# Patient Record
Sex: MALE | Race: WHITE | NOT HISPANIC OR LATINO | Employment: FULL TIME | URBAN - METROPOLITAN AREA
[De-identification: names, ages, dates, MRNs, and addresses within clinical notes are randomized per-mention and may not be internally consistent; named-entity substitution may affect disease eponyms.]

---

## 2018-04-17 ENCOUNTER — HOSPITAL ENCOUNTER (OUTPATIENT)
Dept: FAMILY MEDICINE CLINIC | Facility: CLINIC | Age: 38
Setting detail: SPECIMEN
Discharge: HOME | End: 2018-04-17
Attending: FAMILY MEDICINE | Admitting: FAMILY MEDICINE

## 2018-04-17 LAB
ALBUMIN SERPL-MCNC: 4.3 G/DL (ref 3.5–4.8)
ALBUMIN/GLOB SERPL: 1.5 {RATIO} (ref 1–1.7)
ALP SERPL-CCNC: 68 IU/L (ref 32–91)
ALT SERPL-CCNC: 32 IU/L (ref 17–63)
ANION GAP SERPL CALC-SCNC: 12.5 MMOL/L (ref 10–20)
AST SERPL-CCNC: 26 IU/L (ref 15–41)
BASOPHILS # BLD AUTO: 0 10*3/UL (ref 0–0.2)
BASOPHILS NFR BLD AUTO: 0 % (ref 0–2)
BILIRUB SERPL-MCNC: 0.8 MG/DL (ref 0.3–1.2)
BUN SERPL-MCNC: 13 MG/DL (ref 8–20)
BUN/CREAT SERPL: 14.4 (ref 6.2–20.3)
CALCIUM SERPL-MCNC: 9.4 MG/DL (ref 8.9–10.3)
CHLORIDE SERPL-SCNC: 105 MMOL/L (ref 101–111)
CHOLEST SERPL-MCNC: 166 MG/DL
CHOLEST/HDLC SERPL: 5 {RATIO}
CONV CO2: 25 MMOL/L (ref 22–32)
CONV LDL CHOLESTEROL DIRECT: 113 MG/DL (ref 0–100)
CONV TOTAL PROTEIN: 7.1 G/DL (ref 6.1–7.9)
CREAT UR-MCNC: 0.9 MG/DL (ref 0.7–1.2)
DIFFERENTIAL METHOD BLD: (no result)
EOSINOPHIL # BLD AUTO: 0.1 10*3/UL (ref 0–0.3)
EOSINOPHIL # BLD AUTO: 1 % (ref 0–3)
ERYTHROCYTE [DISTWIDTH] IN BLOOD BY AUTOMATED COUNT: 13.5 % (ref 11.5–14.5)
GLOBULIN UR ELPH-MCNC: 2.8 G/DL (ref 2.5–3.8)
GLUCOSE SERPL-MCNC: 91 MG/DL (ref 65–99)
HCT VFR BLD AUTO: 50.7 % (ref 40–54)
HDLC SERPL-MCNC: 33 MG/DL
HGB BLD-MCNC: 16.9 G/DL (ref 14–18)
LDLC/HDLC SERPL: 3.4 {RATIO}
LIPID INTERPRETATION: ABNORMAL
LYMPHOCYTES # BLD AUTO: 1.8 10*3/UL (ref 0.8–4.8)
LYMPHOCYTES NFR BLD AUTO: 23 % (ref 18–42)
MCH RBC QN AUTO: 30.8 PG (ref 26–32)
MCHC RBC AUTO-ENTMCNC: 33.3 G/DL (ref 32–36)
MCV RBC AUTO: 92.5 FL (ref 80–94)
MONOCYTES # BLD AUTO: 0.7 10*3/UL (ref 0.1–1.3)
MONOCYTES NFR BLD AUTO: 10 % (ref 2–11)
NEUTROPHILS # BLD AUTO: 5 10*3/UL (ref 2.3–8.6)
NEUTROPHILS NFR BLD AUTO: 66 % (ref 50–75)
NRBC BLD AUTO-RTO: 0 /100{WBCS}
NRBC/RBC NFR BLD MANUAL: 0 10*3/UL
PLATELET # BLD AUTO: 292 10*3/UL (ref 150–450)
PMV BLD AUTO: 7.7 FL (ref 7.4–10.4)
POTASSIUM SERPL-SCNC: 3.5 MMOL/L (ref 3.6–5.1)
RBC # BLD AUTO: 5.48 10*6/UL (ref 4.6–6)
SODIUM SERPL-SCNC: 139 MMOL/L (ref 136–144)
TRIGL SERPL-MCNC: 136 MG/DL
VLDLC SERPL CALC-MCNC: 20 MG/DL
WBC # BLD AUTO: 7.7 10*3/UL (ref 4.5–11.5)

## 2019-04-10 ENCOUNTER — HOSPITAL ENCOUNTER (OUTPATIENT)
Dept: FAMILY MEDICINE CLINIC | Facility: CLINIC | Age: 39
Setting detail: SPECIMEN
Discharge: HOME | End: 2019-04-10
Attending: FAMILY MEDICINE | Admitting: FAMILY MEDICINE

## 2019-04-10 LAB
ALBUMIN SERPL-MCNC: 4.1 G/DL (ref 3.5–4.8)
ALBUMIN/GLOB SERPL: 1.5 {RATIO} (ref 1–1.7)
ALP SERPL-CCNC: 63 IU/L (ref 32–91)
ALT SERPL-CCNC: 28 IU/L (ref 17–63)
ANION GAP SERPL CALC-SCNC: 15.1 MMOL/L (ref 10–20)
AST SERPL-CCNC: 19 IU/L (ref 15–41)
BASOPHILS # BLD AUTO: 0 10*3/UL (ref 0–0.2)
BASOPHILS NFR BLD AUTO: 0 % (ref 0–2)
BILIRUB SERPL-MCNC: 0.8 MG/DL (ref 0.3–1.2)
BUN SERPL-MCNC: 15 MG/DL (ref 8–20)
BUN/CREAT SERPL: 16.7 (ref 6.2–20.3)
CALCIUM SERPL-MCNC: 9.1 MG/DL (ref 8.9–10.3)
CHLORIDE SERPL-SCNC: 100 MMOL/L (ref 101–111)
CHOLEST SERPL-MCNC: 174 MG/DL
CHOLEST/HDLC SERPL: 5.6 {RATIO}
CONV CO2: 26 MMOL/L (ref 22–32)
CONV LDL CHOLESTEROL DIRECT: 115 MG/DL (ref 0–100)
CONV TOTAL PROTEIN: 6.8 G/DL (ref 6.1–7.9)
CREAT UR-MCNC: 0.9 MG/DL (ref 0.7–1.2)
DIFFERENTIAL METHOD BLD: (no result)
EOSINOPHIL # BLD AUTO: 0.2 10*3/UL (ref 0–0.3)
EOSINOPHIL # BLD AUTO: 3 % (ref 0–3)
ERYTHROCYTE [DISTWIDTH] IN BLOOD BY AUTOMATED COUNT: 13.1 % (ref 11.5–14.5)
GLOBULIN UR ELPH-MCNC: 2.7 G/DL (ref 2.5–3.8)
GLUCOSE SERPL-MCNC: 101 MG/DL (ref 65–99)
HCT VFR BLD AUTO: 47.2 % (ref 40–54)
HDLC SERPL-MCNC: 31 MG/DL
HGB BLD-MCNC: 16.1 G/DL (ref 14–18)
LDLC/HDLC SERPL: 3.7 {RATIO}
LIPID INTERPRETATION: ABNORMAL
LYMPHOCYTES # BLD AUTO: 2.2 10*3/UL (ref 0.8–4.8)
LYMPHOCYTES NFR BLD AUTO: 26 % (ref 18–42)
MCH RBC QN AUTO: 31.8 PG (ref 26–32)
MCHC RBC AUTO-ENTMCNC: 34.1 G/DL (ref 32–36)
MCV RBC AUTO: 93.1 FL (ref 80–94)
MONOCYTES # BLD AUTO: 0.8 10*3/UL (ref 0.1–1.3)
MONOCYTES NFR BLD AUTO: 9 % (ref 2–11)
NEUTROPHILS # BLD AUTO: 5.2 10*3/UL (ref 2.3–8.6)
NEUTROPHILS NFR BLD AUTO: 62 % (ref 50–75)
NRBC BLD AUTO-RTO: 0 /100{WBCS}
NRBC/RBC NFR BLD MANUAL: 0 10*3/UL
PLATELET # BLD AUTO: 335 10*3/UL (ref 150–450)
PMV BLD AUTO: 7.5 FL (ref 7.4–10.4)
POTASSIUM SERPL-SCNC: 4.1 MMOL/L (ref 3.6–5.1)
RBC # BLD AUTO: 5.07 10*6/UL (ref 4.6–6)
SODIUM SERPL-SCNC: 137 MMOL/L (ref 136–144)
TRIGL SERPL-MCNC: 279 MG/DL
VLDLC SERPL CALC-MCNC: 27.9 MG/DL
WBC # BLD AUTO: 8.4 10*3/UL (ref 4.5–11.5)

## 2019-04-17 ENCOUNTER — HOSPITAL ENCOUNTER (OUTPATIENT)
Dept: FAMILY MEDICINE CLINIC | Facility: CLINIC | Age: 39
Setting detail: SPECIMEN
Discharge: HOME | End: 2019-04-17
Attending: FAMILY MEDICINE | Admitting: FAMILY MEDICINE

## 2019-04-17 ENCOUNTER — HOSPITAL ENCOUNTER (OUTPATIENT)
Dept: GENERAL RADIOLOGY | Facility: HOSPITAL | Age: 39
Discharge: HOME | End: 2019-04-17
Attending: FAMILY MEDICINE | Admitting: FAMILY MEDICINE

## 2019-04-17 LAB
ALBUMIN SERPL-MCNC: 4.6 G/DL (ref 3.5–4.8)
ALBUMIN/GLOB SERPL: 1.5 {RATIO} (ref 1–1.7)
ALP SERPL-CCNC: 73 IU/L (ref 32–91)
ALT SERPL-CCNC: 26 IU/L (ref 17–63)
ANION GAP SERPL CALC-SCNC: 14.6 MMOL/L (ref 10–20)
AST SERPL-CCNC: 22 IU/L (ref 15–41)
BASOPHILS # BLD AUTO: 0 10*3/UL (ref 0–0.2)
BASOPHILS NFR BLD AUTO: 0 % (ref 0–2)
BILIRUB SERPL-MCNC: 1 MG/DL (ref 0.3–1.2)
BILIRUB UR QL STRIP: ABNORMAL
BILIRUB UR QL STRIP: ABNORMAL MG/DL
BUN SERPL-MCNC: 16 MG/DL (ref 8–20)
BUN/CREAT SERPL: 16 (ref 6.2–20.3)
CALCIUM SERPL-MCNC: 9.8 MG/DL (ref 8.9–10.3)
CASTS URNS QL MICRO: ABNORMAL /[LPF]
CHLORIDE SERPL-SCNC: 101 MMOL/L (ref 101–111)
COLOR UR: ABNORMAL
CONV BACTERIA IN URINE MICRO: NEGATIVE
CONV CLARITY OF URINE: CLEAR
CONV CO2: 26 MMOL/L (ref 22–32)
CONV HYALINE CASTS IN URINE MICRO: ABNORMAL /[LPF] (ref 0–5)
CONV PROTEIN IN URINE BY AUTOMATED TEST STRIP: ABNORMAL MG/DL
CONV SMALL ROUND CELLS: ABNORMAL /[HPF]
CONV TOTAL PROTEIN: 7.6 G/DL (ref 6.1–7.9)
CONV UROBILINOGEN IN URINE BY AUTOMATED TEST STRIP: 1 MG/DL
CREAT UR-MCNC: 1 MG/DL (ref 0.7–1.2)
CULTURE INDICATED?: ABNORMAL
DIFFERENTIAL METHOD BLD: (no result)
EOSINOPHIL # BLD AUTO: 0.1 10*3/UL (ref 0–0.3)
EOSINOPHIL # BLD AUTO: 1 % (ref 0–3)
ERYTHROCYTE [DISTWIDTH] IN BLOOD BY AUTOMATED COUNT: 13.2 % (ref 11.5–14.5)
GLOBULIN UR ELPH-MCNC: 3 G/DL (ref 2.5–3.8)
GLUCOSE SERPL-MCNC: 99 MG/DL (ref 65–99)
GLUCOSE UR QL: NEGATIVE MG/DL
HCT VFR BLD AUTO: 50.6 % (ref 40–54)
HGB BLD-MCNC: 17.1 G/DL (ref 14–18)
HGB UR QL STRIP: NEGATIVE
KETONES UR QL STRIP: ABNORMAL MG/DL
LEUKOCYTE ESTERASE UR QL STRIP: NEGATIVE
LIPASE SERPL-CCNC: 28 U/L (ref 22–51)
LYMPHOCYTES # BLD AUTO: 2.1 10*3/UL (ref 0.8–4.8)
LYMPHOCYTES NFR BLD AUTO: 18 % (ref 18–42)
MCH RBC QN AUTO: 31.5 PG (ref 26–32)
MCHC RBC AUTO-ENTMCNC: 33.9 G/DL (ref 32–36)
MCV RBC AUTO: 93.1 FL (ref 80–94)
MONOCYTES # BLD AUTO: 1 10*3/UL (ref 0.1–1.3)
MONOCYTES NFR BLD AUTO: 9 % (ref 2–11)
NEUTROPHILS # BLD AUTO: 8.6 10*3/UL (ref 2.3–8.6)
NEUTROPHILS NFR BLD AUTO: 72 % (ref 50–75)
NITRITE UR QL STRIP: NEGATIVE
NRBC BLD AUTO-RTO: 0 /100{WBCS}
NRBC/RBC NFR BLD MANUAL: 0 10*3/UL
PH UR STRIP.AUTO: 6 [PH] (ref 4.5–8)
PLATELET # BLD AUTO: 318 10*3/UL (ref 150–450)
PMV BLD AUTO: 7.5 FL (ref 7.4–10.4)
POTASSIUM SERPL-SCNC: 3.6 MMOL/L (ref 3.6–5.1)
RBC # BLD AUTO: 5.43 10*6/UL (ref 4.6–6)
RBC #/AREA URNS HPF: 1 /[HPF] (ref 0–3)
SODIUM SERPL-SCNC: 138 MMOL/L (ref 136–144)
SP GR UR: 1.03 (ref 1–1.03)
SPERM URNS QL MICRO: ABNORMAL /[HPF]
SQUAMOUS SPT QL MICRO: 0 /[HPF] (ref 0–5)
UNIDENT CRYS URNS QL MICRO: ABNORMAL /[HPF]
WBC # BLD AUTO: 11.9 10*3/UL (ref 4.5–11.5)
WBC #/AREA URNS HPF: 2 /[HPF] (ref 0–5)
YEAST SPEC QL WET PREP: ABNORMAL /[HPF]

## 2019-04-19 ENCOUNTER — HOSPITAL ENCOUNTER (OUTPATIENT)
Dept: ULTRASOUND IMAGING | Facility: HOSPITAL | Age: 39
Discharge: HOME | End: 2019-04-19
Attending: FAMILY MEDICINE | Admitting: FAMILY MEDICINE

## 2019-04-25 ENCOUNTER — HOSPITAL ENCOUNTER (OUTPATIENT)
Dept: FAMILY MEDICINE CLINIC | Facility: CLINIC | Age: 39
Setting detail: SPECIMEN
Discharge: HOME | End: 2019-04-25
Attending: FAMILY MEDICINE | Admitting: FAMILY MEDICINE

## 2019-04-25 LAB
BASOPHILS # BLD AUTO: 0 10*3/UL (ref 0–0.2)
BASOPHILS NFR BLD AUTO: 0 % (ref 0–2)
DIFFERENTIAL METHOD BLD: (no result)
EOSINOPHIL # BLD AUTO: 0.2 10*3/UL (ref 0–0.3)
EOSINOPHIL # BLD AUTO: 2 % (ref 0–3)
ERYTHROCYTE [DISTWIDTH] IN BLOOD BY AUTOMATED COUNT: 13 % (ref 11.5–14.5)
HCT VFR BLD AUTO: 47.5 % (ref 40–54)
HGB BLD-MCNC: 16 G/DL (ref 14–18)
LYMPHOCYTES # BLD AUTO: 2.2 10*3/UL (ref 0.8–4.8)
LYMPHOCYTES NFR BLD AUTO: 24 % (ref 18–42)
MCH RBC QN AUTO: 31.5 PG (ref 26–32)
MCHC RBC AUTO-ENTMCNC: 33.7 G/DL (ref 32–36)
MCV RBC AUTO: 93.5 FL (ref 80–94)
MONOCYTES # BLD AUTO: 1.1 10*3/UL (ref 0.1–1.3)
MONOCYTES NFR BLD AUTO: 13 % (ref 2–11)
NEUTROPHILS # BLD AUTO: 5.5 10*3/UL (ref 2.3–8.6)
NEUTROPHILS NFR BLD AUTO: 61 % (ref 50–75)
NRBC BLD AUTO-RTO: 0 /100{WBCS}
NRBC/RBC NFR BLD MANUAL: 0 10*3/UL
PLATELET # BLD AUTO: 280 10*3/UL (ref 150–450)
PMV BLD AUTO: 7.8 FL (ref 7.4–10.4)
RBC # BLD AUTO: 5.08 10*6/UL (ref 4.6–6)
WBC # BLD AUTO: 9.1 10*3/UL (ref 4.5–11.5)

## 2019-05-02 ENCOUNTER — HOSPITAL ENCOUNTER (OUTPATIENT)
Dept: CT IMAGING | Facility: HOSPITAL | Age: 39
Discharge: HOME | End: 2019-05-02
Attending: FAMILY MEDICINE | Admitting: FAMILY MEDICINE

## 2019-07-07 RX ORDER — LOSARTAN POTASSIUM AND HYDROCHLOROTHIAZIDE 25; 100 MG/1; MG/1
TABLET ORAL
Qty: 90 TABLET | Refills: 2 | Status: SHIPPED | OUTPATIENT
Start: 2019-07-07 | End: 2020-03-24

## 2019-09-27 ENCOUNTER — OFFICE VISIT (OUTPATIENT)
Dept: FAMILY MEDICINE CLINIC | Facility: CLINIC | Age: 39
End: 2019-09-27

## 2019-09-27 VITALS
HEART RATE: 79 BPM | WEIGHT: 292 LBS | SYSTOLIC BLOOD PRESSURE: 136 MMHG | TEMPERATURE: 96.8 F | RESPIRATION RATE: 16 BRPM | BODY MASS INDEX: 41.8 KG/M2 | OXYGEN SATURATION: 98 % | HEIGHT: 70 IN | DIASTOLIC BLOOD PRESSURE: 90 MMHG

## 2019-09-27 DIAGNOSIS — M54.16 LUMBAR RADICULOPATHY: ICD-10-CM

## 2019-09-27 DIAGNOSIS — M54.42 CHRONIC BILATERAL LOW BACK PAIN WITH BILATERAL SCIATICA: Primary | ICD-10-CM

## 2019-09-27 DIAGNOSIS — M54.41 CHRONIC BILATERAL LOW BACK PAIN WITH BILATERAL SCIATICA: Primary | ICD-10-CM

## 2019-09-27 DIAGNOSIS — G89.29 CHRONIC BILATERAL LOW BACK PAIN WITH BILATERAL SCIATICA: Primary | ICD-10-CM

## 2019-09-27 PROBLEM — I10 HYPERTENSION: Status: ACTIVE | Noted: 2019-09-27

## 2019-09-27 PROBLEM — Z00.00 ENCOUNTER FOR GENERAL ADULT MEDICAL EXAMINATION WITHOUT ABNORMAL FINDINGS: Status: ACTIVE | Noted: 2019-04-05

## 2019-09-27 PROCEDURE — 99213 OFFICE O/P EST LOW 20 MIN: CPT | Performed by: FAMILY MEDICINE

## 2019-09-27 RX ORDER — CYCLOBENZAPRINE HCL 10 MG
10 TABLET ORAL 3 TIMES DAILY PRN
Qty: 30 TABLET | Refills: 0 | Status: SHIPPED | OUTPATIENT
Start: 2019-09-27 | End: 2020-02-05

## 2019-09-27 RX ORDER — ACETAMINOPHEN 160 MG
TABLET,DISINTEGRATING ORAL
COMMUNITY
Start: 2016-06-06 | End: 2020-02-05

## 2019-09-27 NOTE — PROGRESS NOTES
Subjective   Chief Complaint   Patient presents with   • Follow-up   • Back Pain     Anders Garcia is a 39 y.o. male.     Patient Care Team:  Alondra Goodman MD as PCP - General  Alondra Goodman MD as PCP - Family Medicine    He is coming in today to talk about his chronic back issues.  He has been dealing with lower back pain on and off for a few years.  He had x-rays and MRIs done.  He was seen by orthopedist and the pain management in the past, however he did not have good response to the epidural shots.  His pain is especially worse at night and sometimes he has to get up and light on the floor due to pain.  He is sporadically taking over-the-counter medicine, but not much.  He does not take any prescription medication for pain.  He denies any numbness or weakness at this point.  He denies any recent traumas.         The following portions of the patient's history were reviewed and updated as appropriate: allergies, current medications, past family history, past medical history, past social history, past surgical history and problem list.  Past Medical History:   Diagnosis Date   • Back pain    • Hyperlipidemia    • Hypertension    • Obesity    • RMSF ([place] spotted fever) 2008   • Vitamin D deficiency      Past Surgical History:   Procedure Laterality Date   • KNEE SURGERY Left 2018    2 times   • TONSILLECTOMY       The patient has a family history of  Family History   Problem Relation Age of Onset   • Hypertension Mother      Social History     Socioeconomic History   • Marital status:      Spouse name: Not on file   • Number of children: Not on file   • Years of education: Not on file   • Highest education level: Not on file   Tobacco Use   • Smoking status: Never Smoker   • Smokeless tobacco: Never Used   Substance and Sexual Activity   • Alcohol use: Yes   • Drug use: No   • Sexual activity: Yes       Review of Systems   Constitutional: Negative for activity change, fatigue and  "fever.   Cardiovascular: Negative for chest pain, palpitations and leg swelling.   Gastrointestinal: Negative for constipation, diarrhea and indigestion.   Musculoskeletal: Positive for arthralgias and back pain. Negative for gait problem and joint swelling.   Skin: Negative for color change, dry skin and rash.   Neurological: Negative for tremors and headache.     Visit Vitals  /90 (BP Location: Left arm, Patient Position: Sitting, Cuff Size: Large Adult)   Pulse 79   Temp 96.8 °F (36 °C) (Oral)   Resp 16   Ht 177.8 cm (70\")   Wt 132 kg (292 lb)   SpO2 98%   BMI 41.90 kg/m²       Current Outpatient Medications:   •  Cholecalciferol (VITAMIN D3) 2000 units capsule, VITAMIN D3 2000 UNIT CAPS, Disp: , Rfl:   •  cyclobenzaprine (FLEXERIL) 10 MG tablet, Take 1 tablet by mouth 3 (Three) Times a Day As Needed for Muscle Spasms., Disp: 30 tablet, Rfl: 0  •  losartan-hydrochlorothiazide (HYZAAR) 100-25 MG per tablet, TAKE 1 TABLET DAILY, Disp: 90 tablet, Rfl: 2    Objective   Physical Exam   Constitutional: He is oriented to person, place, and time. He appears well-developed and well-nourished.   HENT:   Head: Normocephalic and atraumatic.   Eyes: Conjunctivae and EOM are normal. Pupils are equal, round, and reactive to light.   Neck: Normal range of motion. Neck supple.   Cardiovascular: Normal rate, regular rhythm, normal heart sounds and intact distal pulses. Exam reveals no gallop.   No murmur heard.  Pulmonary/Chest: Effort normal and breath sounds normal. No respiratory distress. He has no rales. He exhibits no tenderness.   Musculoskeletal: Normal range of motion. He exhibits no edema or deformity.   Neurological: He is alert and oriented to person, place, and time.   Skin: Skin is warm and dry.   Nursing note and vitals reviewed.             Assessment/Plan   Problems Addressed this Visit        Nervous and Auditory    Low back pain - Primary    Lumbar radiculopathy        His chronic back issues are not well " controlled.  He has tried anti-inflammatories and steroids in the past.  He is also seen pain management and tried epidural shots with pretty much no response.  His symptoms now might be potentiated by not sleeping on the right mattress and I recommend for him to get a new mattress to see if that cannot help with a low back pain symptom control.  I also gave him prescription for muscle relaxer to take at bedtime as needed.             Requested Prescriptions     Signed Prescriptions Disp Refills   • cyclobenzaprine (FLEXERIL) 10 MG tablet 30 tablet 0     Sig: Take 1 tablet by mouth 3 (Three) Times a Day As Needed for Muscle Spasms.

## 2019-12-26 ENCOUNTER — OFFICE VISIT (OUTPATIENT)
Dept: FAMILY MEDICINE CLINIC | Facility: CLINIC | Age: 39
End: 2019-12-26

## 2019-12-26 VITALS
DIASTOLIC BLOOD PRESSURE: 88 MMHG | WEIGHT: 297 LBS | BODY MASS INDEX: 42.52 KG/M2 | SYSTOLIC BLOOD PRESSURE: 154 MMHG | HEIGHT: 70 IN | TEMPERATURE: 98.9 F | HEART RATE: 86 BPM | OXYGEN SATURATION: 93 %

## 2019-12-26 DIAGNOSIS — J40 BRONCHITIS: ICD-10-CM

## 2019-12-26 DIAGNOSIS — M25.512 ACUTE PAIN OF LEFT SHOULDER: Primary | ICD-10-CM

## 2019-12-26 DIAGNOSIS — I10 ESSENTIAL HYPERTENSION: ICD-10-CM

## 2019-12-26 PROCEDURE — 99214 OFFICE O/P EST MOD 30 MIN: CPT | Performed by: FAMILY MEDICINE

## 2019-12-26 RX ORDER — AZITHROMYCIN 250 MG/1
250 TABLET, FILM COATED ORAL DAILY
Qty: 6 TABLET | Refills: 0 | Status: SHIPPED | OUTPATIENT
Start: 2019-12-26 | End: 2019-12-31

## 2019-12-26 RX ORDER — METHYLPREDNISOLONE 4 MG/1
TABLET ORAL
Qty: 1 EACH | Refills: 0 | Status: SHIPPED | OUTPATIENT
Start: 2019-12-26 | End: 2020-01-22

## 2019-12-26 RX ORDER — ALBUTEROL SULFATE 90 UG/1
2 AEROSOL, METERED RESPIRATORY (INHALATION) EVERY 6 HOURS PRN
Qty: 1 INHALER | Refills: 0 | Status: SHIPPED | OUTPATIENT
Start: 2019-12-26 | End: 2019-12-26

## 2019-12-26 RX ORDER — ALBUTEROL SULFATE 90 UG/1
AEROSOL, METERED RESPIRATORY (INHALATION)
Qty: 25.5 G | Refills: 0 | Status: SHIPPED | OUTPATIENT
Start: 2019-12-26

## 2019-12-26 NOTE — PROGRESS NOTES
Subjective   Chief Complaint   Patient presents with   • Shoulder Pain     left shoulder   • Bronchitis     Anders Garcia is a 39 y.o. male.     Patient Care Team:  Alondra Goodman MD as PCP - General  Alondra Goodman MD as PCP - Family Medicine    He is coming in today to talk about few of his concerns.  He reports having pain in his left shoulder for the last 2-3 weeks.  He started having some ache they are and then he took a trip to [place] with his family.  He was caring his toddler daughter and his arms majority of the time and mainly on the left side.  He reports pretty much constant ache in the shoulder area and sometimes radiating to his neck.  He denies any weakness.  He feels that certain movement definitely makes the pain worse.  He denies any numbness and tingling.  He also reports having a lot of cough and congestion for the last 5 days or so.  He feels tightness in his chest at times and wheezing.  He has been using albuterol which she has and that gives him temporary relief.  He has been coughing up yellow sputum.  He denies chest pains.  He has been having some postnasal drainage as well.  He denies any nausea, vomiting, or diarrhea.  No rashes reported.       The following portions of the patient's history were reviewed and updated as appropriate: allergies, current medications, past family history, past medical history, past social history, past surgical history and problem list.  Past Medical History:   Diagnosis Date   • Back pain    • Hyperlipidemia    • Hypertension    • Obesity    • RMSF ([place] spotted fever) 2008   • Vitamin D deficiency      Past Surgical History:   Procedure Laterality Date   • KNEE SURGERY Left 2018    2 times   • TONSILLECTOMY       The patient has a family history of  Family History   Problem Relation Age of Onset   • Hypertension Mother      Social History     Socioeconomic History   • Marital status:      Spouse name: Not on file   •  "Number of children: Not on file   • Years of education: Not on file   • Highest education level: Not on file   Tobacco Use   • Smoking status: Never Smoker   • Smokeless tobacco: Never Used   Substance and Sexual Activity   • Alcohol use: Yes   • Drug use: No   • Sexual activity: Yes       Review of Systems   Constitutional: Negative for activity change, fatigue and fever.   HENT: Positive for congestion and postnasal drip. Negative for sinus pressure, swollen glands and voice change.    Respiratory: Positive for cough and wheezing. Negative for choking, shortness of breath and stridor.    Cardiovascular: Negative for chest pain, palpitations and leg swelling.   Gastrointestinal: Negative for constipation, diarrhea and indigestion.   Musculoskeletal: Positive for arthralgias. Negative for back pain, gait problem, joint swelling, myalgias and bursitis.   Skin: Negative for color change, dry skin and rash.   Allergic/Immunologic: Positive for environmental allergies.   Neurological: Negative for tremors and headache.     Visit Vitals  /88 (BP Location: Left arm, Patient Position: Sitting, Cuff Size: Adult)   Pulse 86   Temp 98.9 °F (37.2 °C) (Axillary)   Ht 177.8 cm (70\")   Wt 135 kg (297 lb)   SpO2 93%   BMI 42.62 kg/m²       Current Outpatient Medications:   •  albuterol sulfate  (90 Base) MCG/ACT inhaler, Inhale 2 puffs Every 6 (Six) Hours As Needed for Wheezing., Disp: 1 inhaler, Rfl: 0  •  azithromycin (ZITHROMAX) 250 MG tablet, Take 1 tablet by mouth Daily for 5 days. Take 2 tablets the first day, then 1 tablet daily for 4 days., Disp: 6 tablet, Rfl: 0  •  Cholecalciferol (VITAMIN D3) 2000 units capsule, VITAMIN D3 2000 UNIT CAPS, Disp: , Rfl:   •  cyclobenzaprine (FLEXERIL) 10 MG tablet, Take 1 tablet by mouth 3 (Three) Times a Day As Needed for Muscle Spasms., Disp: 30 tablet, Rfl: 0  •  losartan-hydrochlorothiazide (HYZAAR) 100-25 MG per tablet, TAKE 1 TABLET DAILY, Disp: 90 tablet, Rfl: 2  •  " methylPREDNISolone (MEDROL) 4 MG tablet, Take as directed on package instructions., Disp: 1 each, Rfl: 0    Objective   Physical Exam   Constitutional: He is oriented to person, place, and time. He appears well-developed and well-nourished.   HENT:   Head: Normocephalic and atraumatic.   Eyes: Pupils are equal, round, and reactive to light. Conjunctivae and EOM are normal.   Neck: Normal range of motion. Neck supple.   Cardiovascular: Normal rate, regular rhythm, normal heart sounds and intact distal pulses. Exam reveals no gallop.   No murmur heard.  Pulmonary/Chest: Effort normal. No respiratory distress. He has wheezes. He has no rales. He exhibits no tenderness.   Coarse breath sounds noted diffusely with end expiratory wheezes and prolonged expiratory phase.   Musculoskeletal: Normal range of motion. He exhibits no edema or deformity.   Left shoulder was examined, there is slightly decreased range of motion due to pain.  There is some pain reported with movement against resistance.  No weakness.   Neurological: He is alert and oriented to person, place, and time.   Skin: Skin is warm and dry.   Nursing note and vitals reviewed.          Assessment/Plan   Problems Addressed this Visit        Cardiovascular and Mediastinum    Hypertension       Respiratory    Bronchitis    Relevant Medications    albuterol sulfate  (90 Base) MCG/ACT inhaler       Nervous and Auditory    Acute pain of left shoulder - Primary        I suspect that his left shoulder pain is due to inflammation in the rotator cuff.  I will start him on Medrol Dosepak and he might need further treatment and testing if his symptoms do not improve.  I will also start him on Z-Tony for his bronchitis and Medrol Dosepak should help with that as well.  I refilled his albuterol to use as needed.  He is to call us back if no improvement.  His blood pressure is slightly elevated today.  He will continue his blood pressure medicine and periodically  monitor his blood pressure.             Requested Prescriptions     Signed Prescriptions Disp Refills   • azithromycin (ZITHROMAX) 250 MG tablet 6 tablet 0     Sig: Take 1 tablet by mouth Daily for 5 days. Take 2 tablets the first day, then 1 tablet daily for 4 days.   • methylPREDNISolone (MEDROL) 4 MG tablet 1 each 0     Sig: Take as directed on package instructions.   • albuterol sulfate  (90 Base) MCG/ACT inhaler 1 inhaler 0     Sig: Inhale 2 puffs Every 6 (Six) Hours As Needed for Wheezing.

## 2020-01-22 ENCOUNTER — OFFICE VISIT (OUTPATIENT)
Dept: FAMILY MEDICINE CLINIC | Facility: CLINIC | Age: 40
End: 2020-01-22

## 2020-01-22 VITALS
OXYGEN SATURATION: 98 % | TEMPERATURE: 97.8 F | HEART RATE: 103 BPM | BODY MASS INDEX: 42.52 KG/M2 | WEIGHT: 297 LBS | HEIGHT: 70 IN | DIASTOLIC BLOOD PRESSURE: 95 MMHG | RESPIRATION RATE: 16 BRPM | SYSTOLIC BLOOD PRESSURE: 141 MMHG

## 2020-01-22 DIAGNOSIS — M25.512 ACUTE PAIN OF LEFT SHOULDER: Primary | ICD-10-CM

## 2020-01-22 DIAGNOSIS — B35.1 ONYCHOMYCOSIS: ICD-10-CM

## 2020-01-22 DIAGNOSIS — I10 ESSENTIAL HYPERTENSION: ICD-10-CM

## 2020-01-22 DIAGNOSIS — B35.1 ONYCHOMYCOSIS: Primary | ICD-10-CM

## 2020-01-22 PROBLEM — J40 BRONCHITIS: Status: RESOLVED | Noted: 2019-12-26 | Resolved: 2020-01-22

## 2020-01-22 LAB
ALBUMIN SERPL-MCNC: 4.4 G/DL (ref 3.5–5.2)
ALBUMIN/GLOB SERPL: 1.6 G/DL
ALP SERPL-CCNC: 73 U/L (ref 39–117)
ALT SERPL W P-5'-P-CCNC: 22 U/L (ref 1–41)
ANION GAP SERPL CALCULATED.3IONS-SCNC: 14.5 MMOL/L (ref 5–15)
AST SERPL-CCNC: 20 U/L (ref 1–40)
BILIRUB SERPL-MCNC: 0.4 MG/DL (ref 0.2–1.2)
BUN BLD-MCNC: 17 MG/DL (ref 6–20)
BUN/CREAT SERPL: 16.7 (ref 7–25)
CALCIUM SPEC-SCNC: 9.3 MG/DL (ref 8.6–10.5)
CHLORIDE SERPL-SCNC: 98 MMOL/L (ref 98–107)
CO2 SERPL-SCNC: 25.5 MMOL/L (ref 22–29)
CREAT BLD-MCNC: 1.02 MG/DL (ref 0.76–1.27)
GFR SERPL CREATININE-BSD FRML MDRD: 82 ML/MIN/1.73
GLOBULIN UR ELPH-MCNC: 2.7 GM/DL
GLUCOSE BLD-MCNC: 98 MG/DL (ref 65–99)
POTASSIUM BLD-SCNC: 3.7 MMOL/L (ref 3.5–5.2)
PROT SERPL-MCNC: 7.1 G/DL (ref 6–8.5)
SODIUM BLD-SCNC: 138 MMOL/L (ref 136–145)

## 2020-01-22 PROCEDURE — 99214 OFFICE O/P EST MOD 30 MIN: CPT | Performed by: FAMILY MEDICINE

## 2020-01-22 PROCEDURE — 80053 COMPREHEN METABOLIC PANEL: CPT | Performed by: FAMILY MEDICINE

## 2020-01-22 PROCEDURE — 36415 COLL VENOUS BLD VENIPUNCTURE: CPT | Performed by: FAMILY MEDICINE

## 2020-01-22 RX ORDER — TERBINAFINE HYDROCHLORIDE 250 MG/1
250 TABLET ORAL DAILY
Qty: 84 TABLET | Refills: 0 | Status: SHIPPED | OUTPATIENT
Start: 2020-01-22 | End: 2022-04-20

## 2020-01-22 NOTE — PROGRESS NOTES
Subjective   Chief Complaint   Patient presents with   • Shoulder Pain     Left   • Nail Problem   • Hypertension     Anders Garcia is a 39 y.o. male.     Patient Care Team:  Alondra Goodman MD as PCP - General  Alondra Goodman MD as PCP - Family Medicine    He is coming in today to discuss few of his issues and concerns.  He first of all wants to talk about the left shoulder pain, which he has been dealing with for about 7 weeks.  He denies any injury or trauma.  He still has pretty good range of motion as he is able to tolerate pain, however it hurts upon several different movements.  His pain is worse at night when he is trying to lie down on the left side.  He denies any weakness, numbness, or tingling.  He has tried over-the-counter anti-inflammatories with no improvement.  He was even on the steroid pack in December, which did not help.  He also wants to talk about the nail issues.  His left great toenail has been yellow and thickened for several months, maybe even a year or 2.  He has tried over-the-counter remedies, but nothing is helping and the nail is getting worse and he would like to be started on some oral medication to get rid of that.  He is also following up on his hypertension.  He takes his medication as directed and denies any side effects.  He does not really monitor his blood pressure at home.       The following portions of the patient's history were reviewed and updated as appropriate: allergies, current medications, past family history, past medical history, past social history, past surgical history and problem list.  Past Medical History:   Diagnosis Date   • Back pain    • Hyperlipidemia    • Hypertension    • Obesity    • RMSF ([place] spotted fever) 2008   • Vitamin D deficiency      Past Surgical History:   Procedure Laterality Date   • KNEE SURGERY Left 2018    2 times   • TONSILLECTOMY       The patient has a family history of  Family History   Problem Relation Age of  "Onset   • Hypertension Mother      Social History     Socioeconomic History   • Marital status:      Spouse name: Not on file   • Number of children: Not on file   • Years of education: Not on file   • Highest education level: Not on file   Tobacco Use   • Smoking status: Never Smoker   • Smokeless tobacco: Never Used   Substance and Sexual Activity   • Alcohol use: Yes   • Drug use: No   • Sexual activity: Yes     Partners: Male     Birth control/protection: None       Review of Systems   Constitutional: Negative for fatigue.   Musculoskeletal: Positive for arthralgias and back pain. Negative for gait problem, joint swelling, myalgias and bursitis.   Neurological: Negative for tremors, weakness and numbness.     Visit Vitals  /95 (BP Location: Left arm, Patient Position: Sitting, Cuff Size: Large Adult)   Pulse 103   Temp 97.8 °F (36.6 °C) (Oral)   Resp 16   Ht 177.8 cm (70\")   Wt 135 kg (297 lb)   SpO2 98%   BMI 42.62 kg/m²       Current Outpatient Medications:   •  ALBUTEROL SULFATE  (90 Base) MCG/ACT inhaler, INHALE 2 PUFFS BY MOUTH EVERY 6 HOURS AS NEEDED FOR WHEEZING, Disp: 25.5 g, Rfl: 0  •  Cholecalciferol (VITAMIN D3) 2000 units capsule, VITAMIN D3 2000 UNIT CAPS, Disp: , Rfl:   •  cyclobenzaprine (FLEXERIL) 10 MG tablet, Take 1 tablet by mouth 3 (Three) Times a Day As Needed for Muscle Spasms., Disp: 30 tablet, Rfl: 0  •  losartan-hydrochlorothiazide (HYZAAR) 100-25 MG per tablet, TAKE 1 TABLET DAILY, Disp: 90 tablet, Rfl: 2  •  terbinafine (LAMISIL) 250 MG tablet, Take 1 tablet by mouth Daily., Disp: 84 tablet, Rfl: 0    Objective   Physical Exam   Constitutional: He is oriented to person, place, and time. He appears well-developed and well-nourished.   HENT:   Head: Normocephalic and atraumatic.   Eyes: Pupils are equal, round, and reactive to light. Conjunctivae and EOM are normal.   Neck: Normal range of motion. Neck supple.   Musculoskeletal: He exhibits tenderness. He exhibits no " edema or deformity.   Left shoulder was examined, full range of motion noted.  There is however pain reported with certain movements and movement against resistance.  No joint line palpation tenderness.   Neurological: He is alert and oriented to person, place, and time.   Skin: Skin is warm. No rash noted. No erythema. No pallor.   Left great toenail is thickened and yellow.  No signs of infection around noted.   Nursing note and vitals reviewed.                  Assessment/Plan   Problems Addressed this Visit        Cardiovascular and Mediastinum    Hypertension       Nervous and Auditory    Acute pain of left shoulder - Primary    Relevant Orders    XR Shoulder 1 View Left    MRI Shoulder Left Without Contrast       Musculoskeletal and Integument    Onychomycosis    Relevant Medications    terbinafine (LAMISIL) 250 MG tablet    Other Relevant Orders    Comprehensive Metabolic Panel        If it comes to his left shoulder problem I suspect that this is due to rotator cuff pathology.  He already tried over-the-counter anti-inflammatories and a steroid pack with no improvement.  His symptoms are getting worse.  I will be getting x-ray and MRI to rule out soft tissue problem.  If it comes to his left great toenail symptoms this is consistent with onychomycosis.  I will start him on Lamisil.  He understands that this medication can potentially affect his liver even though the risk is low.  I will be getting baseline liver test and he will come back for the recheck in 1 month.  His hypertension is pretty well controlled with his current medications and he will continue the same.             Requested Prescriptions     Signed Prescriptions Disp Refills   • terbinafine (LAMISIL) 250 MG tablet 84 tablet 0     Sig: Take 1 tablet by mouth Daily.

## 2020-02-03 DIAGNOSIS — M25.512 ACUTE PAIN OF LEFT SHOULDER: Primary | ICD-10-CM

## 2020-02-05 ENCOUNTER — OFFICE VISIT (OUTPATIENT)
Dept: ORTHOPEDIC SURGERY | Facility: CLINIC | Age: 40
End: 2020-02-05

## 2020-02-05 VITALS
BODY MASS INDEX: 43.23 KG/M2 | HEART RATE: 70 BPM | WEIGHT: 302 LBS | SYSTOLIC BLOOD PRESSURE: 127 MMHG | HEIGHT: 70 IN | DIASTOLIC BLOOD PRESSURE: 82 MMHG

## 2020-02-05 DIAGNOSIS — M75.52 SUBACROMIAL BURSITIS OF LEFT SHOULDER JOINT: Primary | ICD-10-CM

## 2020-02-05 DIAGNOSIS — M75.82 TENDINITIS OF LEFT ROTATOR CUFF: ICD-10-CM

## 2020-02-05 PROCEDURE — 99203 OFFICE O/P NEW LOW 30 MIN: CPT | Performed by: FAMILY MEDICINE

## 2020-02-05 PROCEDURE — 20610 DRAIN/INJ JOINT/BURSA W/O US: CPT | Performed by: FAMILY MEDICINE

## 2020-02-05 RX ORDER — TRIAMCINOLONE ACETONIDE 40 MG/ML
80 INJECTION, SUSPENSION INTRA-ARTICULAR; INTRAMUSCULAR
Status: COMPLETED | OUTPATIENT
Start: 2020-02-05 | End: 2020-02-05

## 2020-02-05 RX ADMIN — TRIAMCINOLONE ACETONIDE 80 MG: 40 INJECTION, SUSPENSION INTRA-ARTICULAR; INTRAMUSCULAR at 09:52

## 2020-02-05 NOTE — PROGRESS NOTES
Primary Care Sports Medicine Office Visit Note     Patient ID: Anders Garcia is a 39 y.o. male.    Chief Complaint:  Chief Complaint   Patient presents with   • Left Shoulder - Initial Evaluation     HPI:    Mr. Anders Garcia is a 39 y.o. male who presents to the clinic today for L shoulder pain. He states that after a trip to [place] in December, his shoulder started bothering him. He states that initially, after lifting and carrying his child more than normal, it insidiously started to ache. Now he has also noticed some occasional clicking. No instability. Mild precieved weakness. Mild decrease in ROM as well. Has attempted IBU/tylenol, ice, heat, lidocaine, icy hot patches. No sensory changes or neuropathy.    Past Medical History:   Diagnosis Date   • Back pain    • Hyperlipidemia    • Hypertension    • Obesity    • RMSF ([place] spotted fever) 2008   • Vitamin D deficiency        Past Surgical History:   Procedure Laterality Date   • KNEE SURGERY Left 2018    2 times   • TONSILLECTOMY         Family History   Problem Relation Age of Onset   • Hypertension Mother      Social History     Occupational History   • Not on file   Tobacco Use   • Smoking status: Never Smoker   • Smokeless tobacco: Current User     Types: Chew   Substance and Sexual Activity   • Alcohol use: Not Currently   • Drug use: No   • Sexual activity: Yes     Partners: Male     Birth control/protection: None      Review of Systems   Constitutional: Negative for activity change and fever.   Respiratory: Negative for cough and shortness of breath.    Cardiovascular: Negative for chest pain.   Gastrointestinal: Negative for constipation, diarrhea, nausea and vomiting.   Musculoskeletal: Positive for arthralgias.   Skin: Negative for color change and rash.   Neurological: Negative for weakness.   Hematological: Does not bruise/bleed easily.       Objective:    /82 (BP Location: Right arm, Patient Position: Sitting, Cuff  "Size: Large Adult)   Pulse 70   Ht 177.8 cm (70\")   Wt (!) 137 kg (302 lb)   BMI 43.33 kg/m²     Physical Examination:  Physical Exam   Constitutional: He appears well-developed and well-nourished. No distress.   HENT:   Head: Normocephalic and atraumatic.   Eyes: Conjunctivae are normal.   Cardiovascular: Intact distal pulses.   Pulmonary/Chest: Effort normal. No respiratory distress.   Neurological: He is alert.   Skin: Skin is warm. Capillary refill takes less than 2 seconds. He is not diaphoretic.   Nursing note and vitals reviewed.    Left Shoulder Exam     Tenderness   The patient is experiencing no tenderness.     Range of Motion   Active abduction: normal   Passive abduction: normal   Extension: normal   External rotation: normal   Forward flexion: normal   Internal rotation 0 degrees:  Mid thoracic normal     Muscle Strength   Supraspinatus: 5/5   Subscapularis: 5/5   Biceps: 5/5     Tests   Clarke test: positive  Cross arm: negative  Impingement: positive  Drop arm: negative  Sulcus: absent    Other   Erythema: absent  Sensation: normal  Pulse: present     Comments:  Mildly positive Iftikhar for pain, positive resisted external rotation for pain as well, negative liftoff.  Negative Mercer's, negative scarf.  Positive Neer.  Negative speeds/Yergason's.    Cervical range of motion is full with no tenderness to palpation to the bony midline or paraspinal cervical musculature.  Spurling's maneuver to the left is negative.          Imaging and other tests:  3v XR from outside facility per my review yields no obvious fracture or other acute bony abnormality. Appropriate anatomic relations and alignment of the GH and rotator cuff interval.     Non-contrasted MRI from outside facility also reviewed today. In summary, there is a questionable very small 6.5mm defect of the inferior glenoid labrum poorly quantified on this non-contrasted study. Mild active AC joint arthritis.     Assessment and Plan:    1. " "Subacromial bursitis of left shoulder joint    2. Tendinitis of left rotator cuff    Today we discussed MRI findings, and all treatment options after discussing what I think the patient's pathology is today.  Ultimately on physical exam he seems to have much more rotator cuff tendinitis/tendinopathy and some subacromial impingement syndrome much greater than any labral finding.  After this discussion, the patient elected to proceed with subacromial bursa corticosteroid injection.  He tolerated this well without complaints or problems.  We did discuss that he should discontinue lifting child, any pushing or pressing overhead activity for the next 1 month or so.  His muscle bulk on MRI is excellent therefore we will hold off on physical therapy for now.  He can return in 1 month if pain persists, clicking happens more frequently, or other symptoms worsen.  At that time we could consider contrasted MRI if labrum is of concern.  RTC 1 month.    Prabhu SOSA \"Chance\" Ferdinand GARZA DO, CAQSM  02/05/20  9:49 AM    Disclaimer: Please note that areas of this note were completed with computer voice recognition software.  Quite often unanticipated grammatical, syntax, homophones, and other interpretive errors are inadvertently transcribed by the computer software. Please excuse any errors that have escaped final proofreading.  "

## 2020-02-05 NOTE — PROGRESS NOTES
Procedure   Large Joint Arthrocentesis: L subacromial bursa  Date/Time: 2/5/2020 9:52 AM  Consent given by: patient  Site marked: site marked  Timeout: Immediately prior to procedure a time out was called to verify the correct patient, procedure, equipment, support staff and site/side marked as required   Supporting Documentation  Indications: pain   Procedure Details  Location: shoulder - L subacromial bursa  Preparation: Patient was prepped and draped in the usual sterile fashion  Needle size: 25 G  Approach: posterior  Medications administered: 80 mg triamcinolone acetonide 40 MG/ML (6cc of 1% lidocaine without epinepherine and 2cc of 40mg Kenalog)  Patient tolerance: patient tolerated the procedure well with no immediate complications (Blood loss negligable, pt admits to almost immediate pain reflief post injection with gentle ROM.)

## 2020-03-24 RX ORDER — LOSARTAN POTASSIUM AND HYDROCHLOROTHIAZIDE 25; 100 MG/1; MG/1
TABLET ORAL
Qty: 90 TABLET | Refills: 1 | Status: SHIPPED | OUTPATIENT
Start: 2020-03-24 | End: 2020-09-16 | Stop reason: SDUPTHER

## 2020-04-02 ENCOUNTER — TELEPHONE (OUTPATIENT)
Dept: FAMILY MEDICINE CLINIC | Facility: CLINIC | Age: 40
End: 2020-04-02

## 2020-04-02 NOTE — TELEPHONE ENCOUNTER
Did his daughter have a contact with the other child in the ?  If so when was the last contact?  Is his daughter symptomatic?  Does he have any symptoms?  It looks like they certainly need to self quarantine.  I would like to get more information to advise further.  Thank you.

## 2020-04-02 NOTE — TELEPHONE ENCOUNTER
Pt's daughter has been out of  for two weeks.  One of the other children tested positive for COVID-19.  Does he need to do anything?  He is very concerned.  Please call [phone].

## 2020-04-03 NOTE — TELEPHONE ENCOUNTER
Spoke to pt, he states that yes, his daughter most likely did have contact with pos COVID-19 ( did not release identity but closed quarters)  It is day 15 since contact, and no symptoms.  He has been working because he was just informed yesterday.

## 2020-04-03 NOTE — TELEPHONE ENCOUNTER
If it is been 15 days since the contact and no symptoms they are pretty much out of the quarantine timeframe as incubation.  Is 2-14 days.  The likelihood that somebody is going to get sick after 15 days is very, very low.  However still monitor for the symptoms development.

## 2020-09-16 RX ORDER — LOSARTAN POTASSIUM AND HYDROCHLOROTHIAZIDE 25; 100 MG/1; MG/1
1 TABLET ORAL DAILY
Qty: 90 TABLET | Refills: 1 | Status: SHIPPED | OUTPATIENT
Start: 2020-09-16 | End: 2020-10-14 | Stop reason: SDUPTHER

## 2020-10-14 RX ORDER — LOSARTAN POTASSIUM AND HYDROCHLOROTHIAZIDE 25; 100 MG/1; MG/1
1 TABLET ORAL DAILY
Qty: 90 TABLET | Refills: 0 | Status: SHIPPED | OUTPATIENT
Start: 2020-10-14 | End: 2020-10-15 | Stop reason: SDUPTHER

## 2020-10-15 ENCOUNTER — TELEPHONE (OUTPATIENT)
Dept: FAMILY MEDICINE CLINIC | Facility: CLINIC | Age: 40
End: 2020-10-15

## 2020-10-15 RX ORDER — LOSARTAN POTASSIUM AND HYDROCHLOROTHIAZIDE 25; 100 MG/1; MG/1
1 TABLET ORAL DAILY
Qty: 90 TABLET | Refills: 0 | Status: SHIPPED | OUTPATIENT
Start: 2020-10-15 | End: 2021-04-12

## 2021-01-15 ENCOUNTER — OFFICE VISIT (OUTPATIENT)
Dept: FAMILY MEDICINE CLINIC | Facility: CLINIC | Age: 41
End: 2021-01-15

## 2021-01-15 ENCOUNTER — TELEPHONE (OUTPATIENT)
Dept: FAMILY MEDICINE CLINIC | Facility: CLINIC | Age: 41
End: 2021-01-15

## 2021-01-15 VITALS
DIASTOLIC BLOOD PRESSURE: 102 MMHG | BODY MASS INDEX: 43.09 KG/M2 | WEIGHT: 301 LBS | TEMPERATURE: 97.3 F | SYSTOLIC BLOOD PRESSURE: 147 MMHG | HEIGHT: 70 IN | HEART RATE: 97 BPM | RESPIRATION RATE: 16 BRPM | OXYGEN SATURATION: 96 %

## 2021-01-15 DIAGNOSIS — I10 ESSENTIAL HYPERTENSION: ICD-10-CM

## 2021-01-15 DIAGNOSIS — M54.50 ACUTE BILATERAL LOW BACK PAIN WITHOUT SCIATICA: Primary | ICD-10-CM

## 2021-01-15 DIAGNOSIS — R31.29 OTHER MICROSCOPIC HEMATURIA: ICD-10-CM

## 2021-01-15 LAB
BILIRUB BLD-MCNC: ABNORMAL MG/DL
CLARITY, POC: ABNORMAL
COLOR UR: YELLOW
GLUCOSE UR STRIP-MCNC: NEGATIVE MG/DL
KETONES UR QL: NEGATIVE
LEUKOCYTE EST, POC: NEGATIVE
NITRITE UR-MCNC: NEGATIVE MG/ML
PH UR: 5.5 [PH] (ref 5–8)
PROT UR STRIP-MCNC: NEGATIVE MG/DL
RBC # UR STRIP: ABNORMAL /UL
SP GR UR: 1.03 (ref 1–1.03)
UROBILINOGEN UR QL: NORMAL

## 2021-01-15 PROCEDURE — 81003 URINALYSIS AUTO W/O SCOPE: CPT | Performed by: FAMILY MEDICINE

## 2021-01-15 PROCEDURE — 99214 OFFICE O/P EST MOD 30 MIN: CPT | Performed by: FAMILY MEDICINE

## 2021-01-15 PROCEDURE — 87086 URINE CULTURE/COLONY COUNT: CPT | Performed by: FAMILY MEDICINE

## 2021-01-15 RX ORDER — METHYLPREDNISOLONE 4 MG/1
TABLET ORAL
Qty: 1 EACH | Refills: 0 | Status: SHIPPED | OUTPATIENT
Start: 2021-01-15 | End: 2021-03-11

## 2021-01-15 RX ORDER — CYCLOBENZAPRINE HCL 10 MG
10 TABLET ORAL NIGHTLY PRN
Qty: 20 TABLET | Refills: 0 | Status: SHIPPED | OUTPATIENT
Start: 2021-01-15

## 2021-01-15 RX ORDER — HYDROCODONE BITARTRATE AND ACETAMINOPHEN 5; 325 MG/1; MG/1
1 TABLET ORAL EVERY 6 HOURS PRN
Qty: 12 TABLET | Refills: 0 | Status: SHIPPED | OUTPATIENT
Start: 2021-01-15 | End: 2021-03-11

## 2021-01-15 NOTE — TELEPHONE ENCOUNTER
Caller: Anders Garcia    Relationship: Self    Best call back number: [phone]    Medication needed: Patient is calling to say the pharmacy only had 2 RX for him from today's visit.  He states he thought Rex Lopez was going to call in a pain medication as well.    Please advise.    When do you need the refill by: 01/15/21    Does the patient have less than a 3 day supply:  [x] Yes  [] No    What is the patient's preferred pharmacy: [id number] - [place], [place] [address] AT [facility] OF  [address] [facility] - [phone]  - [phone] FX

## 2021-01-15 NOTE — PROGRESS NOTES
Subjective   Chief Complaint   Patient presents with   • Back Pain     for few days   • Urinary Frequency   • Hypertension     Anders Garcia is a 40 y.o. male.     Patient Care Team:  Alondra Goodman MD as PCP - General  Alondra Goodman MD as PCP - Family Medicine    He is coming in today due to new onset of back pain.  He is here today with his wife.  He has pain in the lower back in the center with radiation to both sides and coming around to both sides.  Pain started few days ago and seems to be affecting his daily activity and his sleep.  He tells me that few days before that he did more physical job, but at first he did not put 1 and 1 together.  He also tells me that over the last few days he noted some urinary frequency.  He denies any burning upon urination and he denies any urgency or blood in urine.  The pain sometimes gets him feeling somehow nauseous, however he denies any vomiting.  He denies any fever.  He has had back issues in the past, however this time around it feels somehow different.  He has never had a urinary tract infection or kidney stone.  We are also addressing his hypertension.  He is currently being treated for it and tells me that periodically when he checks his blood pressure at home the readings look very good.  Today he took a nap and took his blood pressure medicine little bit later, his blood pressure today is elevated.  He denies any headaches, chest pains, or shortness of breath.       The following portions of the patient's history were reviewed and updated as appropriate: allergies, current medications, past family history, past medical history, past social history, past surgical history and problem list.  Past Medical History:   Diagnosis Date   • Back pain    • Hyperlipidemia    • Hypertension    • Obesity    • RMSF ([place] spotted fever) 2008   • Vitamin D deficiency      Past Surgical History:   Procedure Laterality Date   • KNEE SURGERY Left 2018    2  "times   • TONSILLECTOMY       The patient has a family history of  Family History   Problem Relation Age of Onset   • Hypertension Mother      Social History     Socioeconomic History   • Marital status:      Spouse name: Not on file   • Number of children: Not on file   • Years of education: Not on file   • Highest education level: Not on file   Tobacco Use   • Smoking status: Never Smoker   • Smokeless tobacco: Current User     Types: Chew   Substance and Sexual Activity   • Alcohol use: Not Currently   • Drug use: No   • Sexual activity: Yes     Partners: Male     Birth control/protection: None       Review of Systems   Constitutional: Negative for activity change, fatigue and fever.   Respiratory: Negative for cough, shortness of breath and wheezing.    Cardiovascular: Negative for chest pain, palpitations and leg swelling.   Gastrointestinal: Negative for constipation, diarrhea and indigestion.   Genitourinary: Positive for frequency. Negative for difficulty urinating, dysuria, flank pain, hematuria, nocturia and urgency.   Musculoskeletal: Positive for back pain.   Skin: Negative for color change, dry skin and rash.   Neurological: Negative for tremors and headache.     Visit Vitals  BP (!) 147/102 (BP Location: Left arm, Patient Position: Sitting, Cuff Size: Large Adult)   Pulse 97   Temp 97.3 °F (36.3 °C) (Temporal)   Resp 16   Ht 177.8 cm (70\")   Wt (!) 137 kg (301 lb)   SpO2 96%   BMI 43.19 kg/m²       Current Outpatient Medications:   •  ALBUTEROL SULFATE  (90 Base) MCG/ACT inhaler, INHALE 2 PUFFS BY MOUTH EVERY 6 HOURS AS NEEDED FOR WHEEZING, Disp: 25.5 g, Rfl: 0  •  cyclobenzaprine (FLEXERIL) 10 MG tablet, Take 1 tablet by mouth At Night As Needed for Muscle Spasms., Disp: 20 tablet, Rfl: 0  •  HYDROcodone-acetaminophen (NORCO) 5-325 MG per tablet, Take 1 tablet by mouth Every 6 (Six) Hours As Needed for Moderate Pain ., Disp: 12 tablet, Rfl: 0  •  losartan-hydrochlorothiazide (HYZAAR) " 100-25 MG per tablet, Take 1 tablet by mouth Daily., Disp: 90 tablet, Rfl: 0  •  methylPREDNISolone (Medrol) 4 MG dose pack, Take as directed on package instructions., Disp: 1 each, Rfl: 0  •  terbinafine (LAMISIL) 250 MG tablet, Take 1 tablet by mouth Daily., Disp: 84 tablet, Rfl: 0    Objective   Physical Exam  Constitutional:       General: He is not in acute distress.     Appearance: Normal appearance. He is well-developed. He is not ill-appearing or diaphoretic.      Comments: Patient is in no distress, patient has normal voice and speech.  Normal respiratory effort.   HENT:      Head: Normocephalic and atraumatic.   Neck:      Musculoskeletal: Normal range of motion and neck supple.   Pulmonary:      Effort: Pulmonary effort is normal.   Abdominal:      General: There is no distension.      Palpations: There is no mass.      Tenderness: There is no abdominal tenderness. There is no right CVA tenderness, left CVA tenderness, guarding or rebound.      Hernia: No hernia is present.   Musculoskeletal:      Comments: There is some mild palpation tenderness along the muscles in the lumbar spine area, negative straight leg raising test bilaterally.   Neurological:      General: No focal deficit present.      Mental Status: He is alert and oriented to person, place, and time. Mental status is at baseline.   Psychiatric:         Mood and Affect: Mood normal.              Office Visit on 01/15/2021   Component Date Value Ref Range Status   • Color 01/15/2021 Yellow  Yellow, Straw, Dark Yellow, [place] Final   • Clarity, UA 01/15/2021 Slightly Cloudy* Clear Final   • Specific Gravity  01/15/2021 1.030  1.005 - 1.030 Final   • pH, Urine 01/15/2021 5.5  5.0 - 8.0 Final   • Leukocytes 01/15/2021 Negative  Negative Final   • Nitrite, UA 01/15/2021 Negative  Negative Final   • Protein, POC 01/15/2021 Negative  Negative mg/dL Final   • Glucose, UA 01/15/2021 Negative  Negative, 1000 mg/dL (3+) mg/dL Final   • Ketones, UA  01/15/2021 Negative  Negative Final   • Urobilinogen, UA 01/15/2021 Normal  Normal Final   • Bilirubin 01/15/2021 Small (1+)* Negative Final   • Blood, UA 01/15/2021 Small* Negative Final                   Assessment/Plan   Diagnoses and all orders for this visit:    1. Acute bilateral low back pain without sciatica (Primary)  -     POC Urinalysis Dipstick, Automated  -     methylPREDNISolone (Medrol) 4 MG dose pack; Take as directed on package instructions.  Dispense: 1 each; Refill: 0  -     cyclobenzaprine (FLEXERIL) 10 MG tablet; Take 1 tablet by mouth At Night As Needed for Muscle Spasms.  Dispense: 20 tablet; Refill: 0  -     HYDROcodone-acetaminophen (NORCO) 5-325 MG per tablet; Take 1 tablet by mouth Every 6 (Six) Hours As Needed for Moderate Pain .  Dispense: 12 tablet; Refill: 0    2. Other microscopic hematuria  -     Urine Culture - Urine, Urine, Clean Catch    3. Essential hypertension      I reviewed his symptoms and concerns.  I suspect that his back pain is more so musculoskeletal and less likely due to urinary issues.  His urine dip done today shows however small amount of blood, I will be getting urine culture as well.  I will be starting him on steroid pack and I also gave him some muscle relaxant in few pain pills.  I talked to him and his wife that I cannot however 100% rule out possibility of his issues being caused by kidney stone.  We will have to see how he responds to treatment and possibly further work-up might be necessary.  His blood pressure is noted to be high today.  He just took his blood pressure pill about an hour before he came to the appointment.  I asked him to recheck blood pressure at home later today and then continue to monitor and call us back if blood pressure stays high.               No follow-ups on file.    Requested Prescriptions     Signed Prescriptions Disp Refills   • methylPREDNISolone (Medrol) 4 MG dose pack 1 each 0     Sig: Take as directed on package  instructions.   • cyclobenzaprine (FLEXERIL) 10 MG tablet 20 tablet 0     Sig: Take 1 tablet by mouth At Night As Needed for Muscle Spasms.   • HYDROcodone-acetaminophen (NORCO) 5-325 MG per tablet 12 tablet 0     Sig: Take 1 tablet by mouth Every 6 (Six) Hours As Needed for Moderate Pain .

## 2021-01-16 PROBLEM — R31.29 OTHER MICROSCOPIC HEMATURIA: Status: ACTIVE | Noted: 2021-01-16

## 2021-01-16 LAB — BACTERIA SPEC AEROBE CULT: NO GROWTH

## 2021-03-11 ENCOUNTER — HOSPITAL ENCOUNTER (OUTPATIENT)
Dept: GENERAL RADIOLOGY | Facility: HOSPITAL | Age: 41
Discharge: HOME | End: 2021-03-11
Admitting: FAMILY MEDICINE

## 2021-03-11 ENCOUNTER — OFFICE VISIT (OUTPATIENT)
Dept: FAMILY MEDICINE CLINIC | Facility: CLINIC | Age: 41
End: 2021-03-11

## 2021-03-11 VITALS
WEIGHT: 296 LBS | RESPIRATION RATE: 16 BRPM | OXYGEN SATURATION: 95 % | HEART RATE: 84 BPM | BODY MASS INDEX: 42.37 KG/M2 | SYSTOLIC BLOOD PRESSURE: 154 MMHG | DIASTOLIC BLOOD PRESSURE: 94 MMHG | TEMPERATURE: 96.8 F | HEIGHT: 70 IN

## 2021-03-11 DIAGNOSIS — B35.1 ONYCHOMYCOSIS: ICD-10-CM

## 2021-03-11 DIAGNOSIS — M79.671 FOOT PAIN, RIGHT: ICD-10-CM

## 2021-03-11 DIAGNOSIS — M79.671 FOOT PAIN, RIGHT: Primary | ICD-10-CM

## 2021-03-11 PROCEDURE — 99213 OFFICE O/P EST LOW 20 MIN: CPT | Performed by: FAMILY MEDICINE

## 2021-03-11 PROCEDURE — 73630 X-RAY EXAM OF FOOT: CPT

## 2021-03-11 NOTE — PROGRESS NOTES
Subjective   Chief Complaint   Patient presents with   • Foot Pain     right     Anders Garcia is a 40 y.o. male.     Patient Care Team:  Alondra Goodman MD as PCP - General  Alondra Goodman MD as PCP - Family Medicine    He is coming in today due to right foot pain.  He reports that for the last couple of months or maybe even longer he noted a pain in the specific spot on the bottom of the right foot just beneath the second metatarsal head.  At first he thought he might have had a foreign body there, but he checked the area and nothing was found.  He denies any particular injury.  He denies any numbness or tingling or any swelling or skin associated changes.  He also wants to talk about the nail issues.  He was treated with Lamisil in January 2020 due to fungus on the left great toenail.  He tells me that this medication completely cleared nail, however over the last couple of months he started noticing that it is coming back.       The following portions of the patient's history were reviewed and updated as appropriate: allergies, current medications, past family history, past medical history, past social history, past surgical history and problem list.  Past Medical History:   Diagnosis Date   • Back pain    • Hyperlipidemia    • Hypertension    • Obesity    • RMSF ([place] spotted fever) 2008   • Vitamin D deficiency      Past Surgical History:   Procedure Laterality Date   • KNEE SURGERY Left 2018    2 times   • TONSILLECTOMY       The patient has a family history of  Family History   Problem Relation Age of Onset   • Hypertension Mother      Social History     Socioeconomic History   • Marital status:      Spouse name: Not on file   • Number of children: Not on file   • Years of education: Not on file   • Highest education level: Not on file   Tobacco Use   • Smoking status: Never Smoker   • Smokeless tobacco: Current User     Types: Chew   Substance and Sexual Activity   • Alcohol use:  "Not Currently   • Drug use: No   • Sexual activity: Yes     Partners: Male     Birth control/protection: None       Review of Systems   Musculoskeletal: Positive for arthralgias. Negative for back pain, gait problem, joint swelling and myalgias.   Skin: Negative for color change, dry skin, pallor and rash.   Neurological: Negative for weakness and numbness.     Visit Vitals  /94 (BP Location: Left arm, Patient Position: Sitting, Cuff Size: Adult)   Pulse 84   Temp 96.8 °F (36 °C) (Temporal)   Resp 16   Ht 177.8 cm (70\")   Wt 134 kg (296 lb)   SpO2 95%   BMI 42.47 kg/m²       Current Outpatient Medications:   •  ALBUTEROL SULFATE  (90 Base) MCG/ACT inhaler, INHALE 2 PUFFS BY MOUTH EVERY 6 HOURS AS NEEDED FOR WHEEZING, Disp: 25.5 g, Rfl: 0  •  cyclobenzaprine (FLEXERIL) 10 MG tablet, Take 1 tablet by mouth At Night As Needed for Muscle Spasms., Disp: 20 tablet, Rfl: 0  •  losartan-hydrochlorothiazide (HYZAAR) 100-25 MG per tablet, Take 1 tablet by mouth Daily., Disp: 90 tablet, Rfl: 0  •  terbinafine (LAMISIL) 250 MG tablet, Take 1 tablet by mouth Daily., Disp: 84 tablet, Rfl: 0    Objective   Physical Exam  Constitutional:       General: He is not in acute distress.     Appearance: Normal appearance. He is well-developed. He is not ill-appearing or diaphoretic.      Comments: Patient is in no distress, patient has normal voice and speech.  Normal respiratory effort.   HENT:      Head: Normocephalic and atraumatic.   Pulmonary:      Effort: Pulmonary effort is normal.   Musculoskeletal:      Cervical back: Normal range of motion and neck supple.      Comments: Right foot was examined, there is no obvious deformity or swelling or any skin associated changes.  There is palpation tenderness at the area of the second metatarsal head.  Full range of motion in all toes noted.  There is a small streak of yellowish discoloration on the left great toenail.   Neurological:      General: No focal deficit present.    "   Mental Status: He is alert and oriented to person, place, and time. Mental status is at baseline.   Psychiatric:         Mood and Affect: Mood normal.         Assessment/Plan   Diagnoses and all orders for this visit:    1. Foot pain, right (Primary)  -     XR Foot 3+ View Right; Future  -     Ambulatory Referral to Podiatry    2. Onychomycosis      I will be getting x-ray of the right foot.  He may try over-the-counter anti-inflammatories.  I will be referring him to see a podiatrist for possible cortisone injection.  He will also address his recurrent toenail issue with a podiatrist.    Return if symptoms worsen or fail to improve, for Recheck.    Requested Prescriptions      No prescriptions requested or ordered in this encounter     Answers for HPI/ROS submitted by the patient on 3/9/2021  What is the primary reason for your visit?: Other  Please describe your symptoms.: Pain in right foot. Inside the foot at the bone. Hurts to walk on.  Have you had these symptoms before?: No  How long have you been having these symptoms?: Greater than 2 weeks  Please list any medications you are currently taking for this condition.: None  Please describe any probable cause for these symptoms. : No clue

## 2021-04-05 ENCOUNTER — OFFICE VISIT (OUTPATIENT)
Dept: PODIATRY | Facility: CLINIC | Age: 41
End: 2021-04-05

## 2021-04-05 VITALS
HEIGHT: 70 IN | WEIGHT: 296 LBS | DIASTOLIC BLOOD PRESSURE: 97 MMHG | HEART RATE: 84 BPM | BODY MASS INDEX: 42.37 KG/M2 | SYSTOLIC BLOOD PRESSURE: 147 MMHG

## 2021-04-05 DIAGNOSIS — M77.41 METATARSALGIA, RIGHT FOOT: ICD-10-CM

## 2021-04-05 DIAGNOSIS — M79.671 RIGHT FOOT PAIN: Primary | ICD-10-CM

## 2021-04-05 PROCEDURE — 99203 OFFICE O/P NEW LOW 30 MIN: CPT | Performed by: PODIATRIST

## 2021-04-06 NOTE — PROGRESS NOTES
04/05/2021  Foot and Ankle Surgery - New Patient   Provider: Dr. Usama Carmona DPM  Location: [facility]    Subjective:  Anders Garcia is a 40 y.o. male.     Chief Complaint   Patient presents with   • Right Foot - Pain       HPI: Patient is a 40-year-old male that presents with right foot pain.  He complains that symptoms have been present for approximately 2 months.  He describes insidious onset and denies any history of injury.  He localizes the majority of the pain to the plantar forefoot region.  Symptoms are worse with increased activity and typically improved with rest.  He has not had these issues in the past and denies any symptoms involving his left foot.  He rates the pain a 7 out of 10 when noticed.    Allergies   Allergen Reactions   • Tramadol Itching       Past Medical History:   Diagnosis Date   • Back pain    • Hyperlipidemia    • Hypertension    • Obesity    • RMSF ([place] spotted fever) 2008   • Vitamin D deficiency        Past Surgical History:   Procedure Laterality Date   • KNEE SURGERY Left 2018    2 times   • TONSILLECTOMY         Family History   Problem Relation Age of Onset   • Hypertension Mother        Social History     Socioeconomic History   • Marital status:      Spouse name: Not on file   • Number of children: Not on file   • Years of education: Not on file   • Highest education level: Not on file   Tobacco Use   • Smoking status: Never Smoker   • Smokeless tobacco: Current User     Types: Chew   Substance and Sexual Activity   • Alcohol use: Not Currently   • Drug use: No   • Sexual activity: Yes     Partners: Male     Birth control/protection: None        Current Outpatient Medications on File Prior to Visit   Medication Sig Dispense Refill   • ALBUTEROL SULFATE  (90 Base) MCG/ACT inhaler INHALE 2 PUFFS BY MOUTH EVERY 6 HOURS AS NEEDED FOR WHEEZING 25.5 g 0   • losartan-hydrochlorothiazide (HYZAAR) 100-25 MG per tablet Take 1 tablet by mouth  "Daily. 90 tablet 0   • cyclobenzaprine (FLEXERIL) 10 MG tablet Take 1 tablet by mouth At Night As Needed for Muscle Spasms. 20 tablet 0   • terbinafine (LAMISIL) 250 MG tablet Take 1 tablet by mouth Daily. 84 tablet 0     No current facility-administered medications on file prior to visit.       Review of Systems:  General: Denies fever, chills, fatigue, and weakness.  Eyes: Denies vision loss, blurry vision, and excessive redness.  ENT: Denies hearing issues and difficulty swallowing.  Cardiovascular: Denies palpitations, chest pain, or syncopal episodes.  Respiratory: Denies shortness of breath, wheezing, and coughing.  GI: Denies abdominal pain, nausea, and vomiting.   : Denies frequency, hematuria, and urgency.  Musculoskeletal: + Right foot pain  Derm: Denies rash, open wounds, or suspicious lesions.  Neuro: Denies headaches, numbness, loss of coordination, and tremors.  Psych: Denies anxiety and depression.  Endocrine: Denies temperature intolerance and changes in appetite.  Heme: Denies bleeding disorders or abnormal bruising.     Objective   /97   Pulse 84   Ht 177.8 cm (70\")   Wt 134 kg (296 lb)   BMI 42.47 kg/m²     Foot/Ankle Exam:       General:   Appearance: appears stated age and healthy    Orientation: AAOx3    Affect: appropriate    Gait: unimpaired      VASCULAR      Right Foot Vascularity   Normal vascular exam    Dorsalis pedis:  2+  Posterior tibial:  2+  Skin Temperature: warm    Edema Grading:  None  CFT:  < 3 seconds  Pedal Hair Growth:  Present  Varicosities: none        NEUROLOGIC     Right Foot Neurologic   Light touch sensation:  Normal  Hot/Cold sensation: normal    Achilles reflex:  2+     MUSCULOSKELETAL      Right Foot Musculoskeletal   Ecchymosis:  None  Tenderness: lesser metatarsophalangeal joints, plantar fascia and 2nd MTPJ    Arch:  Normal     MUSCLE STRENGTH     Right Foot Muscle Strength   Normal strength    Foot dorsiflexion:  5  Foot plantar flexion:  5  Foot " inversion:  5  Foot eversion:  5     DERMATOLOGIC     Right Foot Dermatologic   Skin: skin intact       TESTS     Right Foot Tests   Anterior drawer: negative    Varus tilt: negative        Right Foot Additional Comments No gross deformity or instability.  Discomfort with palpation to the plantar aspect of the second metatarsophalangeal joint.  No marily positive push-up test.      Assessment/Plan   Diagnoses and all orders for this visit:    1. Right foot pain (Primary)    2. Metatarsalgia, right foot      Patient presents with generalized discomfort involving the forefoot with activity.  A majority of the pain is localized to the second metatarsophalangeal joint.  Imaging was reviewed showing no obvious fracture or dislocation.  I explained that his symptoms are likely secondary to overuse and lack of support.  I have advised him to acquire a pair of over-the-counter arch supports with metatarsal pad for forefoot offloading.  I would also like him to start stretching manual therapy exercises on a daily basis.  He is to monitor the symptoms and return in 4 weeks for reevaluation.  We did discuss rice therapy and proper use of over-the-counter anti-inflammatory such as ibuprofen.    No orders of the defined types were placed in this encounter.       Note is dictated utilizing voice recognition software. Unfortunately this leads to occasional typographical errors. I apologize in advance if the situation occurs. If questions occur please do not hesitate to call our office.

## 2021-04-12 RX ORDER — LOSARTAN POTASSIUM AND HYDROCHLOROTHIAZIDE 25; 100 MG/1; MG/1
TABLET ORAL
Qty: 90 TABLET | Refills: 1 | Status: SHIPPED | OUTPATIENT
Start: 2021-04-12 | End: 2021-10-11 | Stop reason: SDUPTHER

## 2021-06-16 ENCOUNTER — TELEPHONE (OUTPATIENT)
Dept: FAMILY MEDICINE CLINIC | Facility: CLINIC | Age: 41
End: 2021-06-16

## 2021-06-16 NOTE — TELEPHONE ENCOUNTER
PATIENT IS CALLING INTO SEE IF IT IS POSSIBLE TO HAVE HIS IMMUNIZATION RECORDS FAXED TO HIM.  HE HAS STARTED A NEW JOB.  HIS FAX NUMBER [phone]      PLEASE ADVISE     [phone]

## 2021-10-11 RX ORDER — LOSARTAN POTASSIUM AND HYDROCHLOROTHIAZIDE 25; 100 MG/1; MG/1
1 TABLET ORAL DAILY
Qty: 90 TABLET | Refills: 1 | Status: SHIPPED | OUTPATIENT
Start: 2021-10-11 | End: 2022-04-08

## 2022-04-08 RX ORDER — LOSARTAN POTASSIUM AND HYDROCHLOROTHIAZIDE 25; 100 MG/1; MG/1
TABLET ORAL
Qty: 90 TABLET | Refills: 0 | Status: SHIPPED | OUTPATIENT
Start: 2022-04-08 | End: 2022-07-07

## 2022-04-20 ENCOUNTER — OFFICE VISIT (OUTPATIENT)
Dept: FAMILY MEDICINE CLINIC | Facility: CLINIC | Age: 42
End: 2022-04-20

## 2022-04-20 ENCOUNTER — LAB (OUTPATIENT)
Dept: FAMILY MEDICINE CLINIC | Facility: CLINIC | Age: 42
End: 2022-04-20

## 2022-04-20 VITALS
HEART RATE: 80 BPM | TEMPERATURE: 97.5 F | SYSTOLIC BLOOD PRESSURE: 160 MMHG | HEIGHT: 72 IN | RESPIRATION RATE: 16 BRPM | WEIGHT: 300.4 LBS | DIASTOLIC BLOOD PRESSURE: 84 MMHG | BODY MASS INDEX: 40.69 KG/M2 | OXYGEN SATURATION: 95 %

## 2022-04-20 DIAGNOSIS — Z00.00 PREVENTATIVE HEALTH CARE: Primary | ICD-10-CM

## 2022-04-20 LAB
25(OH)D3 SERPL-MCNC: 38.9 NG/ML (ref 30–100)
ALBUMIN SERPL-MCNC: 4.4 G/DL (ref 3.5–5.2)
ALBUMIN/GLOB SERPL: 1.6 G/DL
ALP SERPL-CCNC: 59 U/L (ref 39–117)
ALT SERPL W P-5'-P-CCNC: 19 U/L (ref 1–41)
ANION GAP SERPL CALCULATED.3IONS-SCNC: 13.1 MMOL/L (ref 5–15)
AST SERPL-CCNC: 16 U/L (ref 1–40)
BASOPHILS # BLD AUTO: 0.02 10*3/MM3 (ref 0–0.2)
BASOPHILS NFR BLD AUTO: 0.3 % (ref 0–1.5)
BILIRUB SERPL-MCNC: 0.5 MG/DL (ref 0–1.2)
BUN SERPL-MCNC: 15 MG/DL (ref 6–20)
BUN/CREAT SERPL: 14.9 (ref 7–25)
CALCIUM SPEC-SCNC: 9.6 MG/DL (ref 8.6–10.5)
CHLORIDE SERPL-SCNC: 101 MMOL/L (ref 98–107)
CHOLEST SERPL-MCNC: 200 MG/DL (ref 0–200)
CO2 SERPL-SCNC: 24.9 MMOL/L (ref 22–29)
CREAT SERPL-MCNC: 1.01 MG/DL (ref 0.76–1.27)
DEPRECATED RDW RBC AUTO: 45.4 FL (ref 37–54)
EGFRCR SERPLBLD CKD-EPI 2021: 96.4 ML/MIN/1.73
EOSINOPHIL # BLD AUTO: 0.16 10*3/MM3 (ref 0–0.4)
EOSINOPHIL NFR BLD AUTO: 2.7 % (ref 0.3–6.2)
ERYTHROCYTE [DISTWIDTH] IN BLOOD BY AUTOMATED COUNT: 13.3 % (ref 12.3–15.4)
GLOBULIN UR ELPH-MCNC: 2.7 GM/DL
GLUCOSE SERPL-MCNC: 94 MG/DL (ref 65–99)
HCT VFR BLD AUTO: 57.9 % (ref 37.5–51)
HCV AB SER DONR QL: NORMAL
HDLC SERPL-MCNC: 33 MG/DL (ref 40–60)
HGB BLD-MCNC: 19.4 G/DL (ref 13–17.7)
IMM GRANULOCYTES # BLD AUTO: 0.02 10*3/MM3 (ref 0–0.05)
IMM GRANULOCYTES NFR BLD AUTO: 0.3 % (ref 0–0.5)
LDLC SERPL CALC-MCNC: 132 MG/DL (ref 0–100)
LDLC/HDLC SERPL: 3.88 {RATIO}
LYMPHOCYTES # BLD AUTO: 1.71 10*3/MM3 (ref 0.7–3.1)
LYMPHOCYTES NFR BLD AUTO: 29 % (ref 19.6–45.3)
MCH RBC QN AUTO: 30.6 PG (ref 26.6–33)
MCHC RBC AUTO-ENTMCNC: 33.5 G/DL (ref 31.5–35.7)
MCV RBC AUTO: 91.5 FL (ref 79–97)
MONOCYTES # BLD AUTO: 0.7 10*3/MM3 (ref 0.1–0.9)
MONOCYTES NFR BLD AUTO: 11.9 % (ref 5–12)
NEUTROPHILS NFR BLD AUTO: 3.29 10*3/MM3 (ref 1.7–7)
NEUTROPHILS NFR BLD AUTO: 55.8 % (ref 42.7–76)
NRBC BLD AUTO-RTO: 0 /100 WBC (ref 0–0.2)
PLATELET # BLD AUTO: 274 10*3/MM3 (ref 140–450)
PMV BLD AUTO: 9.4 FL (ref 6–12)
POTASSIUM SERPL-SCNC: 3.8 MMOL/L (ref 3.5–5.2)
PROT SERPL-MCNC: 7.1 G/DL (ref 6–8.5)
RBC # BLD AUTO: 6.33 10*6/MM3 (ref 4.14–5.8)
SODIUM SERPL-SCNC: 139 MMOL/L (ref 136–145)
TRIGL SERPL-MCNC: 195 MG/DL (ref 0–150)
TSH SERPL DL<=0.05 MIU/L-ACNC: 1.04 UIU/ML (ref 0.27–4.2)
VLDLC SERPL-MCNC: 35 MG/DL (ref 5–40)
WBC NRBC COR # BLD: 5.9 10*3/MM3 (ref 3.4–10.8)

## 2022-04-20 PROCEDURE — 80053 COMPREHEN METABOLIC PANEL: CPT | Performed by: FAMILY MEDICINE

## 2022-04-20 PROCEDURE — 99396 PREV VISIT EST AGE 40-64: CPT | Performed by: FAMILY MEDICINE

## 2022-04-20 PROCEDURE — 84466 ASSAY OF TRANSFERRIN: CPT | Performed by: FAMILY MEDICINE

## 2022-04-20 PROCEDURE — 36415 COLL VENOUS BLD VENIPUNCTURE: CPT | Performed by: FAMILY MEDICINE

## 2022-04-20 PROCEDURE — 85025 COMPLETE CBC W/AUTO DIFF WBC: CPT | Performed by: FAMILY MEDICINE

## 2022-04-20 PROCEDURE — 84443 ASSAY THYROID STIM HORMONE: CPT | Performed by: FAMILY MEDICINE

## 2022-04-20 PROCEDURE — 82306 VITAMIN D 25 HYDROXY: CPT | Performed by: FAMILY MEDICINE

## 2022-04-20 PROCEDURE — 86803 HEPATITIS C AB TEST: CPT | Performed by: FAMILY MEDICINE

## 2022-04-20 PROCEDURE — 82728 ASSAY OF FERRITIN: CPT | Performed by: FAMILY MEDICINE

## 2022-04-20 PROCEDURE — 83540 ASSAY OF IRON: CPT | Performed by: FAMILY MEDICINE

## 2022-04-20 PROCEDURE — 80061 LIPID PANEL: CPT | Performed by: FAMILY MEDICINE

## 2022-04-20 RX ORDER — DICYCLOMINE HYDROCHLORIDE 10 MG/1
10 CAPSULE ORAL 3 TIMES DAILY PRN
Qty: 30 CAPSULE | Refills: 0 | Status: SHIPPED | OUTPATIENT
Start: 2022-04-20 | End: 2022-06-06 | Stop reason: SDUPTHER

## 2022-04-20 RX ORDER — DICYCLOMINE HYDROCHLORIDE 10 MG/1
10 CAPSULE ORAL 3 TIMES DAILY PRN
Qty: 30 CAPSULE | Refills: 0 | Status: SHIPPED | OUTPATIENT
Start: 2022-04-20 | End: 2022-04-20 | Stop reason: SDUPTHER

## 2022-04-20 NOTE — PROGRESS NOTES
Subjective   Chief Complaint   Patient presents with   • Annual Exam     Anders Garcia is a 41 y.o. male.     Patient Care Team:  Alondra Goodman MD as PCP - General  Alondra Goodman MD as PCP - Family Medicine    He is coming in today for his annual wellness exam.  He is currently being treated for hypertension and he reports taking his medicine every day as directed, he does not monitor his blood pressure at home.  He tells me that for the last 1.5 years he has been experiencing some abdominal cramping in the morning, it typically starts about 20-30 minutes after he gets up.  He then has a bowel movement and that relieves the cramping.  He denies any blood in stool or black stools.  He has noted however some mucus in the stool.  No chest pains, shortness of breath, dizziness, or syncope are reported.  He works full-time at a health department.  He has been getting his immunization up to speed.  He is vaccinated against COVID-19 including the booster shot.       The following portions of the patient's history were reviewed and updated as appropriate: allergies, current medications, past family history, past medical history, past social history, past surgical history and problem list.  Past Medical History:   Diagnosis Date   • Back pain    • Hyperlipidemia    • Hypertension    • Obesity    • RMSF ([place] spotted fever) 2008   • Vitamin D deficiency      Past Surgical History:   Procedure Laterality Date   • KNEE SURGERY Left 2018    2 times   • TONSILLECTOMY       The patient has a family history of  Family History   Problem Relation Age of Onset   • Hypertension Mother      Social History     Socioeconomic History   • Marital status:    Tobacco Use   • Smoking status: Never Smoker   • Smokeless tobacco: Current User     Types: Chew   Substance and Sexual Activity   • Alcohol use: Not Currently   • Drug use: No   • Sexual activity: Yes     Partners: Male     Birth control/protection: None  "      Review of Systems   Constitutional: Negative for activity change, appetite change, chills, fatigue, fever, unexpected weight gain and unexpected weight loss.   HENT: Negative for congestion, ear pain, hearing loss, nosebleeds, postnasal drip, swollen glands, tinnitus and trouble swallowing.    Eyes: Negative for blurred vision, double vision and visual disturbance.   Respiratory: Negative for cough, choking, chest tightness, shortness of breath, wheezing and stridor.    Cardiovascular: Negative for chest pain, palpitations and leg swelling.   Gastrointestinal: Negative for abdominal distention, abdominal pain, anal bleeding, constipation, diarrhea, nausea, vomiting and GERD.   Endocrine: Negative for cold intolerance, heat intolerance and polyphagia.   Genitourinary: Negative for dysuria, flank pain, frequency, hematuria and urgency.   Musculoskeletal: Negative for arthralgias, back pain, gait problem, joint swelling and neck pain.   Skin: Negative for color change, dry skin and skin lesions.   Allergic/Immunologic: Negative for environmental allergies and food allergies.   Neurological: Negative for dizziness, tremors, speech difficulty, light-headedness, numbness, headache and confusion.   Hematological: Negative for adenopathy. Does not bruise/bleed easily.   Psychiatric/Behavioral: Negative for decreased concentration, sleep disturbance, depressed mood and stress.     Visit Vitals  /84 (BP Location: Left arm, Patient Position: Sitting, Cuff Size: Adult)   Pulse 80   Temp 97.5 °F (36.4 °C) (Temporal)   Resp 16   Ht 182.9 cm (72\")   Wt (!) 136 kg (300 lb 6.4 oz)   SpO2 95%   BMI 40.74 kg/m²       Current Outpatient Medications:   •  ALBUTEROL SULFATE  (90 Base) MCG/ACT inhaler, INHALE 2 PUFFS BY MOUTH EVERY 6 HOURS AS NEEDED FOR WHEEZING, Disp: 25.5 g, Rfl: 0  •  dicyclomine (Bentyl) 10 MG capsule, Take 1 capsule by mouth 3 (Three) Times a Day As Needed (IBS)., Disp: 30 capsule, Rfl: 0  •  " losartan-hydrochlorothiazide (HYZAAR) 100-25 MG per tablet, TAKE 1 TABLET DAILY, Disp: 90 tablet, Rfl: 0  •  cyclobenzaprine (FLEXERIL) 10 MG tablet, Take 1 tablet by mouth At Night As Needed for Muscle Spasms., Disp: 20 tablet, Rfl: 0    Objective   Physical Exam  Vitals and nursing note reviewed.   Constitutional:       General: He is not in acute distress.     Appearance: He is well-developed. He is not diaphoretic.   HENT:      Head: Normocephalic and atraumatic.      Right Ear: External ear normal.      Left Ear: External ear normal.   Eyes:      General: No scleral icterus.        Right eye: No discharge.         Left eye: No discharge.      Conjunctiva/sclera: Conjunctivae normal.      Pupils: Pupils are equal, round, and reactive to light.   Neck:      Thyroid: No thyromegaly.      Vascular: No JVD.   Cardiovascular:      Rate and Rhythm: Normal rate and regular rhythm.      Heart sounds: Normal heart sounds. No murmur heard.    No friction rub. No gallop.   Pulmonary:      Effort: Pulmonary effort is normal. No respiratory distress.      Breath sounds: Normal breath sounds. No stridor. No wheezing, rhonchi or rales.   Abdominal:      General: Bowel sounds are normal. There is no distension.      Palpations: Abdomen is soft. There is no mass.      Tenderness: There is no abdominal tenderness. There is no guarding or rebound.      Hernia: No hernia is present.   Musculoskeletal:         General: No swelling, tenderness or deformity. Normal range of motion.      Cervical back: Normal range of motion and neck supple. No muscular tenderness.      Right lower leg: No edema.      Left lower leg: No edema.   Lymphadenopathy:      Cervical: No cervical adenopathy.   Skin:     General: Skin is warm and dry.      Capillary Refill: Capillary refill takes less than 2 seconds.      Coloration: Skin is not pale.      Findings: No bruising, erythema, lesion or rash.   Neurological:      General: No focal deficit present.       Mental Status: He is alert and oriented to person, place, and time.      Cranial Nerves: No cranial nerve deficit.      Sensory: No sensory deficit.      Motor: No weakness.      Coordination: Coordination normal.      Deep Tendon Reflexes: Reflexes normal.   Psychiatric:         Mood and Affect: Mood normal.         Behavior: Behavior normal.         Judgment: Judgment normal.           Assessment/Plan   Diagnoses and all orders for this visit:    1. Preventative health care (Primary)  -     Comprehensive Metabolic Panel  -     Lipid Panel  -     TSH  -     Vitamin D 25 Hydroxy  -     CBC Auto Differential  -     Hepatitis C Antibody    Other orders  -     Discontinue: dicyclomine (Bentyl) 10 MG capsule; Take 1 capsule by mouth 3 (Three) Times a Day As Needed (IBS).  Dispense: 30 capsule; Refill: 0  -     dicyclomine (Bentyl) 10 MG capsule; Take 1 capsule by mouth 3 (Three) Times a Day As Needed (IBS).  Dispense: 30 capsule; Refill: 0      Wellness exam was done today - see above for details. Healthy life style was reviewed and discussed and re-enforced. Regular exercise and healthy diet were also discussed and recommended.  I will be getting fasting blood work.  His blood pressure is noted to be elevated today.  He is to continue his current blood pressure medicine and I also advised for him to start checking his blood pressure at home rather regularly and contact us back if his blood pressure stays elevated.  We also addressed his GI issues and that seems to be due to IBS with diarrhea.  I given prescription for Bentyl to try and he is to contact us back with the feedback whether this medicine helps or not.  He is vaccinated against COVID-19 including the booster shot.      Return in about 1 year (around 4/20/2023) for Annual physical.    Requested Prescriptions     Signed Prescriptions Disp Refills   • dicyclomine (Bentyl) 10 MG capsule 30 capsule 0     Sig: Take 1 capsule by mouth 3 (Three) Times a Day As  Needed (IBS).

## 2022-04-21 DIAGNOSIS — D75.1 POLYCYTHEMIA: Primary | ICD-10-CM

## 2022-04-21 LAB
FERRITIN SERPL-MCNC: 146 NG/ML (ref 30–400)
IRON 24H UR-MRATE: 130 MCG/DL (ref 59–158)
IRON SATN MFR SERPL: 27 % (ref 20–50)
TIBC SERPL-MCNC: 475 MCG/DL (ref 298–536)
TRANSFERRIN SERPL-MCNC: 319 MG/DL (ref 200–360)

## 2022-04-21 NOTE — PROGRESS NOTES
I called and notified the patient. He takes Vitamin D 3,000 Iu daily, OTC Potassium ( he does not know the dosage and not at home right now), and Omeprazole 40 mg otc. He will make a lab appt to get cbc repeated next week.

## 2022-06-01 ENCOUNTER — TELEPHONE (OUTPATIENT)
Dept: FAMILY MEDICINE CLINIC | Facility: CLINIC | Age: 42
End: 2022-06-01

## 2022-06-01 NOTE — TELEPHONE ENCOUNTER
I called the patient and informed him that lab orders were faxed to [facility] in [facility] per his request

## 2022-06-01 NOTE — TELEPHONE ENCOUNTER
CBC order was put into his chart on 4/21/2022.  Please fax to the facility as requested by the patient.  Thank you.

## 2022-06-01 NOTE — TELEPHONE ENCOUNTER
Caller: Anders Garcia    Relationship: Self    Best call back number: [phone]     What orders are you requesting (i.e. lab or imaging): LABS - CBC AUTO DIFF ORDERED ON 04/21/22    In what timeframe would the patient need to come in: AS SOON AS POSSIBLE    Where will you receive your lab/imaging services: [facility] LOCATED IN THE [facility]     FAX: [phone]

## 2022-06-03 LAB
BASOPHILS # BLD AUTO: 0 X10E3/UL (ref 0–0.2)
BASOPHILS NFR BLD AUTO: 0 %
EOSINOPHIL # BLD AUTO: 0.2 X10E3/UL (ref 0–0.4)
EOSINOPHIL NFR BLD AUTO: 3 %
ERYTHROCYTE [DISTWIDTH] IN BLOOD BY AUTOMATED COUNT: 13.2 % (ref 11.6–15.4)
HCT VFR BLD AUTO: 55.2 % (ref 37.5–51)
HGB BLD-MCNC: 18.8 G/DL (ref 13–17.7)
IMM GRANULOCYTES # BLD AUTO: 0 X10E3/UL (ref 0–0.1)
IMM GRANULOCYTES NFR BLD AUTO: 0 %
LYMPHOCYTES # BLD AUTO: 2.2 X10E3/UL (ref 0.7–3.1)
LYMPHOCYTES NFR BLD AUTO: 30 %
MCH RBC QN AUTO: 30.8 PG (ref 26.6–33)
MCHC RBC AUTO-ENTMCNC: 34.1 G/DL (ref 31.5–35.7)
MCV RBC AUTO: 90 FL (ref 79–97)
MONOCYTES # BLD AUTO: 0.9 X10E3/UL (ref 0.1–0.9)
MONOCYTES NFR BLD AUTO: 12 %
NEUTROPHILS # BLD AUTO: 4 X10E3/UL (ref 1.4–7)
NEUTROPHILS NFR BLD AUTO: 55 %
PLATELET # BLD AUTO: 239 X10E3/UL (ref 150–450)
RBC # BLD AUTO: 6.11 X10E6/UL (ref 4.14–5.8)
WBC # BLD AUTO: 7.3 X10E3/UL (ref 3.4–10.8)

## 2022-06-06 DIAGNOSIS — D75.1 POLYCYTHEMIA: Primary | ICD-10-CM

## 2022-06-06 RX ORDER — DICYCLOMINE HYDROCHLORIDE 10 MG/1
10 CAPSULE ORAL 2 TIMES DAILY
Qty: 180 CAPSULE | Refills: 1 | Status: SHIPPED | OUTPATIENT
Start: 2022-06-06

## 2022-06-27 ENCOUNTER — TELEPHONE (OUTPATIENT)
Dept: ONCOLOGY | Facility: CLINIC | Age: 42
End: 2022-06-27

## 2022-06-27 NOTE — TELEPHONE ENCOUNTER
Hub is instructed to read the documentation below to patient  ATTEMPTED TO CONTACT PATIENT REGARDING 6-29-22 NEW PT APPT.  NO ANSWER.  LMV INFORMING PATIENT WE NEED T RESCHEDULE THIS APPT DUE TO DR. EPPS HAVING MEDICAL EMERGENCY.  INSTRUCTED PATIENT TO CALL BACK TO RESCHEDULE.

## 2022-06-29 ENCOUNTER — APPOINTMENT (OUTPATIENT)
Dept: LAB | Facility: HOSPITAL | Age: 42
End: 2022-06-29

## 2022-07-07 RX ORDER — LOSARTAN POTASSIUM AND HYDROCHLOROTHIAZIDE 25; 100 MG/1; MG/1
TABLET ORAL
Qty: 90 TABLET | Refills: 1 | Status: SHIPPED | OUTPATIENT
Start: 2022-07-07 | End: 2023-01-03

## 2023-01-03 RX ORDER — LOSARTAN POTASSIUM AND HYDROCHLOROTHIAZIDE 25; 100 MG/1; MG/1
TABLET ORAL
Qty: 90 TABLET | Refills: 0 | Status: SHIPPED | OUTPATIENT
Start: 2023-01-03 | End: 2023-04-03

## 2023-04-03 RX ORDER — LOSARTAN POTASSIUM AND HYDROCHLOROTHIAZIDE 25; 100 MG/1; MG/1
TABLET ORAL
Qty: 90 TABLET | Refills: 0 | Status: SHIPPED | OUTPATIENT
Start: 2023-04-03

## 2023-07-03 PROBLEM — R06.83 SNORING: Status: ACTIVE | Noted: 2023-07-03

## 2023-07-03 PROBLEM — D75.1 POLYCYTHEMIA: Status: ACTIVE | Noted: 2023-07-03

## 2023-07-28 NOTE — PROGRESS NOTES
Hematology/Oncology Outpatient Consultation    Patient name: Anders Garcia  : 1980  MRN: [id number]  Primary Care Physician: Alondra Goodman MD  Referring Physician: Alondra Goodman MD  Reason For Consult:     Chief Complaint   Patient presents with    Appointment     Polycythemia       History of Present Illness:      This is a 43-year-old male has referred secondary to polycythemia.  Patient has been noted to have polycythemia since 2022.  At that time white count was 5.9, hemoglobin 19.4, MCV was normal at 91 and platelets were 274.  7/3/2023 white count was 7.0 hemoglobin is 19 platelets are 269 with essentially unremarkable differentials.  Today 2023 white count is 8.5, hemoglobin is 20.2 and platelets are 181 with unremarkable differentials.    Patient has been referred due to polycythemia.  He had a chemistry panel about a month ago which was essentially unremarkable with a BUN of 16, creatinine of 1.22.    Patient has been on testosterone 2 times a week since . Patient is on 0.5mls twice weekly.  Otherwise he has no prior history of any hematologic problems.  Has never received blood transfusions in the past.      Patient does not smoke. He does not drink    Patient works in environmentl  specialist    Family history of cancer both grnaparents      Past Medical History:   Diagnosis Date    Back pain     Hyperlipidemia     Hypertension     Obesity     RMSF ([place] Mountain spotted fever)     Vitamin D deficiency        Past Surgical History:   Procedure Laterality Date    KNEE SURGERY Left     2 times    TONSILLECTOMY           Current Outpatient Medications:     ALBUTEROL SULFATE  (90 Base) MCG/ACT inhaler, INHALE 2 PUFFS BY MOUTH EVERY 6 HOURS AS NEEDED FOR WHEEZING, Disp: 25.5 g, Rfl: 0    cyclobenzaprine (FLEXERIL) 10 MG tablet, Take 1 tablet by mouth Every 8 (Eight) Hours As Needed., Disp: , Rfl:     dicyclomine (Bentyl) 10 MG capsule, Take 1 capsule by mouth  2 (Two) Times a Day., Disp: 180 capsule, Rfl: 1    losartan-hydrochlorothiazide (HYZAAR) 100-25 MG per tablet, Take 1 tablet by mouth Daily., Disp: 90 tablet, Rfl: 3    Allergies   Allergen Reactions    Tramadol Itching       Immunization History   Administered Date(s) Administered    COVID-19 (MODERNA) 1st,2nd,3rd Dose Monovalent 01/28/2021, 02/25/2021, 11/08/2021    DTP 1980, 1980, 03/20/1982, 06/10/1983, 07/15/1986    Flu Vaccine Intradermal Quad 18-64YR 01/14/2013    Flu Vaccine Quad PF 6-35MO 10/10/2017, 11/23/2019    Flu Vaccine Quad PF >36MO 10/10/2017, 11/23/2019, 10/08/2021    Flu Vaccine Split Quad 01/14/2013    FluMist 2-49yrs 11/07/2014    Fluzone Quad >6mos (Multi-dose) 10/10/2018    Hepatitis B 07/09/2021, 08/12/2021, 02/07/2022    INFLUENZA SPLIT TRI 12/20/2010    Influenza LAIV (Nasal) 10/18/2013    MMR 09/22/1982, 05/12/1992    OPV 1981    Polio, Unspecified 1981, 1981, 03/20/1982, 06/10/1983, 07/15/1986    Tdap 07/09/2021    flucelvax quad pfs =>4 YRS 10/10/2018       Family History   Problem Relation Age of Onset    Hypertension Mother        Cancer-related family history is not on file.    Social History     Tobacco Use    Smoking status: Never    Smokeless tobacco: Current     Types: Chew   Substance Use Topics    Alcohol use: Not Currently    Drug use: No       ROS:    Review of Systems   Constitutional:  Negative for chills, fatigue and fever.   HENT:  Negative for congestion, drooling, ear discharge, rhinorrhea, sinus pressure and tinnitus.    Eyes:  Negative for photophobia, pain and discharge.   Respiratory:  Negative for apnea, choking and stridor.    Cardiovascular:  Negative for palpitations.   Gastrointestinal:  Negative for abdominal distention, abdominal pain and anal bleeding.   Endocrine: Negative for polydipsia and polyphagia.   Genitourinary:  Negative for decreased urine volume, flank pain and genital sores.   Musculoskeletal:  Negative for gait  "problem, neck pain and neck stiffness.   Skin:  Negative for color change, rash and wound.   Neurological:  Negative for tremors, seizures, syncope, facial asymmetry and speech difficulty.   Hematological:  Negative for adenopathy.   Psychiatric/Behavioral:  Negative for agitation, confusion, hallucinations and self-injury. The patient is not hyperactive.      Objective:    Vitals:    07/31/23 1213   BP: 131/86   Pulse: 66   Resp: 20   Temp: 98 øF (36.7 øC)   TempSrc: Infrared   SpO2: 97%   Weight: 135 kg (298 lb)   Height: 182.9 cm (72\")   PainSc: 0-No pain     Body mass index is 40.42 kg/mý.    ECOG  (0) Fully active, able to carry on all predisease performance without restriction    Physical Exam:  Physical Exam  Vitals and nursing note reviewed.   Constitutional:       General: He is not in acute distress.     Appearance: He is not diaphoretic.   HENT:      Head: Normocephalic and atraumatic.   Eyes:      General: No scleral icterus.        Right eye: No discharge.         Left eye: No discharge.      Conjunctiva/sclera: Conjunctivae normal.   Neck:      Thyroid: No thyromegaly.   Cardiovascular:      Rate and Rhythm: Normal rate and regular rhythm.      Heart sounds: Normal heart sounds.     No friction rub. No gallop.   Pulmonary:      Effort: Pulmonary effort is normal. No respiratory distress.      Breath sounds: No stridor. No wheezing.   Abdominal:      General: Bowel sounds are normal.      Palpations: Abdomen is soft. There is no mass.      Tenderness: There is no abdominal tenderness. There is no guarding or rebound.   Musculoskeletal:         General: No tenderness. Normal range of motion.      Cervical back: Normal range of motion and neck supple.   Lymphadenopathy:      Cervical: No cervical adenopathy.   Skin:     General: Skin is warm.      Findings: No erythema or rash.   Neurological:      Mental Status: He is alert and oriented to person, place, and time.      Motor: No abnormal muscle tone. "   Psychiatric:         Behavior: Behavior normal.       RECENT LABS  WBC   Date Value Ref Range Status   07/31/2023 8.53 3.40 - 10.80 10*3/mm3 Final   06/02/2022 7.3 3.4 - 10.8 x10E3/uL Final     RBC   Date Value Ref Range Status   07/31/2023 6.38 (H) 4.14 - 5.80 10*6/mm3 Final   06/02/2022 6.11 (H) 4.14 - 5.80 x10E6/uL Final     Hemoglobin   Date Value Ref Range Status   07/31/2023 20.2 (H) 13.0 - 17.7 g/dL Final     Hematocrit   Date Value Ref Range Status   08/01/2023 55.0 (H) 37.5 - 51.0 % Final     MCV   Date Value Ref Range Status   07/31/2023 90.3 79.0 - 97.0 fL Final     MCH   Date Value Ref Range Status   07/31/2023 31.7 26.6 - 33.0 pg Final     MCHC   Date Value Ref Range Status   07/31/2023 35.1 31.5 - 35.7 g/dL Final     RDW   Date Value Ref Range Status   07/31/2023 14.0 12.3 - 15.4 % Final     RDW-SD   Date Value Ref Range Status   07/31/2023 43.9 37.0 - 54.0 fl Final     MPV   Date Value Ref Range Status   07/31/2023 9.7 6.0 - 12.0 fL Final     Platelets   Date Value Ref Range Status   07/31/2023 181 140 - 450 10*3/mm3 Final     Neutrophil %   Date Value Ref Range Status   07/31/2023 59.1 42.7 - 76.0 % Final     Lymphocyte %   Date Value Ref Range Status   07/31/2023 25.7 19.6 - 45.3 % Final     Monocyte %   Date Value Ref Range Status   07/31/2023 13.4 (H) 5.0 - 12.0 % Final     Eosinophil %   Date Value Ref Range Status   07/31/2023 1.6 0.3 - 6.2 % Final     Basophil %   Date Value Ref Range Status   07/31/2023 0.2 0.0 - 1.5 % Final     Immature Grans %   Date Value Ref Range Status   07/03/2023 0.4 0.0 - 0.5 % Final     Neutrophils, Absolute   Date Value Ref Range Status   07/31/2023 5.04 1.70 - 7.00 10*3/mm3 Final     Lymphocytes, Absolute   Date Value Ref Range Status   07/31/2023 2.19 0.70 - 3.10 10*3/mm3 Final     Monocytes, Absolute   Date Value Ref Range Status   07/31/2023 1.14 (H) 0.10 - 0.90 10*3/mm3 Final     Eosinophils, Absolute   Date Value Ref Range Status   07/31/2023 0.14 0.00 -  0.40 10*3/mm3 Final     Basophils, Absolute   Date Value Ref Range Status   07/31/2023 0.02 0.00 - 0.20 10*3/mm3 Final     Immature Grans, Absolute   Date Value Ref Range Status   07/03/2023 0.03 0.00 - 0.05 10*3/mm3 Final     nRBC   Date Value Ref Range Status   07/03/2023 0.0 0.0 - 0.2 /100 WBC Final       Lab Results   Component Value Date    GLUCOSE 93 07/03/2023    BUN 16 07/03/2023    CREATININE 1.22 07/03/2023    EGFRIFNONA 82 01/22/2020    BCR 13.1 07/03/2023    K 3.7 07/03/2023    CO2 28.3 07/03/2023    CALCIUM 9.8 07/03/2023    ALBUMIN 4.5 07/03/2023    LABIL2 1.5 04/17/2019    AST 22 07/03/2023    ALT 19 07/03/2023         Assessment & Plan   Polycythemia  - CBC & Differential  - BCR-ABL FISH  - Erythropoietin  - Vitamin B12  - JAK2 Mutation Analysis, Qual  - Uric Acid  - Lactate Dehydrogenase  - Methylmalonic Acid, Serum  - US Abdomen Limited      Polycythemia rule out myeloproliferative disease.  Discussed the differential diagnosis with patient primary versus secondary.  Suspect this may be secondary secondary to testosterone administration.  Also discussed importance of completing his workup  Testosterone administration likely contributing to the above      Plans    We will begin phlebotomy since his hemoglobin is up to 20 g per DL  Goal be to get him down to 17 g per DL or less  Will work him up for primary polycythemia versus secondary  Patient will follow-up with his physician for adjustment of his testosterone medication  phlebotomize 250 cc of whole blood q weekly for 3 weeks for hemoglobin of more than 17,   Schedule ultrasound of the abdomen to evaluate for spleen size.   Follow-up 4 weeks   All questions answered    Patient verbalized understanding and is in agreement of the above plan.              I spent 45 total minutes, face-to-face, caring for Anders today. 90% of this time involved counseling and/or coordination of care as documented within this note.

## 2023-07-31 ENCOUNTER — CONSULT (OUTPATIENT)
Dept: ONCOLOGY | Facility: CLINIC | Age: 43
End: 2023-07-31
Payer: OTHER GOVERNMENT

## 2023-07-31 ENCOUNTER — LAB (OUTPATIENT)
Dept: LAB | Facility: HOSPITAL | Age: 43
End: 2023-07-31
Payer: OTHER GOVERNMENT

## 2023-07-31 VITALS
HEART RATE: 66 BPM | OXYGEN SATURATION: 97 % | TEMPERATURE: 98 F | SYSTOLIC BLOOD PRESSURE: 131 MMHG | RESPIRATION RATE: 20 BRPM | BODY MASS INDEX: 40.36 KG/M2 | DIASTOLIC BLOOD PRESSURE: 86 MMHG | WEIGHT: 298 LBS | HEIGHT: 72 IN

## 2023-07-31 DIAGNOSIS — D75.1 POLYCYTHEMIA: ICD-10-CM

## 2023-07-31 DIAGNOSIS — D75.1 POLYCYTHEMIA: Primary | ICD-10-CM

## 2023-07-31 LAB
BASOPHILS # BLD AUTO: 0.02 10*3/MM3 (ref 0–0.2)
BASOPHILS NFR BLD AUTO: 0.2 % (ref 0–1.5)
DEPRECATED RDW RBC AUTO: 43.9 FL (ref 37–54)
EOSINOPHIL # BLD AUTO: 0.14 10*3/MM3 (ref 0–0.4)
EOSINOPHIL NFR BLD AUTO: 1.6 % (ref 0.3–6.2)
ERYTHROCYTE [DISTWIDTH] IN BLOOD BY AUTOMATED COUNT: 14 % (ref 12.3–15.4)
HCT VFR BLD AUTO: 57.6 % (ref 37.5–51)
HGB BLD-MCNC: 20.2 G/DL (ref 13–17.7)
LYMPHOCYTES # BLD AUTO: 2.19 10*3/MM3 (ref 0.7–3.1)
LYMPHOCYTES NFR BLD AUTO: 25.7 % (ref 19.6–45.3)
MCH RBC QN AUTO: 31.7 PG (ref 26.6–33)
MCHC RBC AUTO-ENTMCNC: 35.1 G/DL (ref 31.5–35.7)
MCV RBC AUTO: 90.3 FL (ref 79–97)
MONOCYTES # BLD AUTO: 1.14 10*3/MM3 (ref 0.1–0.9)
MONOCYTES NFR BLD AUTO: 13.4 % (ref 5–12)
NEUTROPHILS NFR BLD AUTO: 5.04 10*3/MM3 (ref 1.7–7)
NEUTROPHILS NFR BLD AUTO: 59.1 % (ref 42.7–76)
PLATELET # BLD AUTO: 181 10*3/MM3 (ref 140–450)
PMV BLD AUTO: 9.7 FL (ref 6–12)
RBC # BLD AUTO: 6.38 10*6/MM3 (ref 4.14–5.8)
WBC NRBC COR # BLD: 8.53 10*3/MM3 (ref 3.4–10.8)

## 2023-07-31 PROCEDURE — 85025 COMPLETE CBC W/AUTO DIFF WBC: CPT

## 2023-07-31 RX ORDER — CYCLOBENZAPRINE HCL 10 MG
1 TABLET ORAL EVERY 8 HOURS PRN
COMMUNITY
Start: 2023-05-11 | End: 2024-05-10

## 2023-07-31 NOTE — LETTER
2023       No Recipients    Patient: Anders Garcia   YOB: 1980   Date of Visit: 2023     Dear Alondra Goodman MD:       Thank you for referring Anders Garcia to me for evaluation. Below are the relevant portions of my assessment and plan of care.    If you have questions, please do not hesitate to call me. I look forward to following Anders along with you.         Sincerely,        Ivy Millan MD        CC:   No Recipients    Ivy Millan MD  [phone]  Sign when Signing Visit   Hematology/Oncology Outpatient Consultation    Patient name: Anders Garcia  : 1980  MRN: [id number]  Primary Care Physician: Alondra Goodman MD  Referring Physician: Alondra Goodman MD  Reason For Consult:     Chief Complaint   Patient presents with    Appointment     Polycythemia       History of Present Illness:      This is a 43-year-old male has referred secondary to polycythemia.  Patient has been noted to have polycythemia since 2022.  At that time white count was 5.9, hemoglobin 19.4, MCV was normal at 91 and platelets were 274.  7/3/2023 white count was 7.0 hemoglobin is 19 platelets are 269 with essentially unremarkable differentials.  Today 2023 white count is 8.5, hemoglobin is 20.2 and platelets are 181 with unremarkable differentials.    Patient has been referred due to polycythemia.  He had a chemistry panel about a month ago which was essentially unremarkable with a BUN of 16, creatinine of 1.22.    Patient has been on testosterone 2 times a week since . Patient is on 0.5mls twice weekly.  Otherwise he has no prior history of any hematologic problems.  Has never received blood transfusions in the past.      Patient does not smoke. 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    Family history of cancer both grnaparents      Past Medical History:   Diagnosis Date    Back pain     Hyperlipidemia     Hypertension      Obesity     RMSF ([place] spotted fever) 2008    Vitamin D deficiency        Past Surgical History:   Procedure Laterality Date    KNEE SURGERY Left 2018    2 times    TONSILLECTOMY           Current Outpatient Medications:     ALBUTEROL SULFATE  (90 Base) MCG/ACT inhaler, INHALE 2 PUFFS BY MOUTH EVERY 6 HOURS AS NEEDED FOR WHEEZING, Disp: 25.5 g, Rfl: 0    cyclobenzaprine (FLEXERIL) 10 MG tablet, Take 1 tablet by mouth Every 8 (Eight) Hours As Needed., Disp: , Rfl:     dicyclomine (Bentyl) 10 MG capsule, Take 1 capsule by mouth 2 (Two) Times a Day., Disp: 180 capsule, Rfl: 1    losartan-hydrochlorothiazide (HYZAAR) 100-25 MG per tablet, Take 1 tablet by mouth Daily., Disp: 90 tablet, Rfl: 3    Allergies   Allergen Reactions    Tramadol Itching       Immunization History   Administered Date(s) Administered    COVID-19 (MODERNA) 1st,2nd,3rd Dose Monovalent 01/28/2021, 02/25/2021, 11/08/2021    DTP 1980, 1980, 03/20/1982, 06/10/1983, 07/15/1986    Flu Vaccine Intradermal Quad 18-64YR 01/14/2013    Flu Vaccine Quad PF 6-35MO 10/10/2017, 11/23/2019    Flu Vaccine Quad PF >36MO 10/10/2017, 11/23/2019, 10/08/2021    Flu Vaccine Split Quad 01/14/2013    FluMist 2-49yrs 11/07/2014    Fluzone Quad >6mos (Multi-dose) 10/10/2018    Hepatitis B 07/09/2021, 08/12/2021, 02/07/2022    INFLUENZA SPLIT TRI 12/20/2010    Influenza LAIV (Nasal) 10/18/2013    MMR 09/22/1982, 05/12/1992    OPV 1980    Polio, Unspecified 1981, 1981, 03/20/1982, 06/10/1983, 07/15/1986    Tdap 07/09/2021    flucelvax quad pfs =>4 YRS 10/10/2018       Family History   Problem Relation Age of Onset    Hypertension Mother        Cancer-related family history is not on file.    Social History     Tobacco Use    Smoking status: Never    Smokeless tobacco: Current     Types: Chew   Substance Use Topics    Alcohol use: Not Currently    Drug use: No       ROS:    Review of Systems  "  Constitutional:  Negative for chills, fatigue and fever.   HENT:  Negative for congestion, drooling, ear discharge, rhinorrhea, sinus pressure and tinnitus.    Eyes:  Negative for photophobia, pain and discharge.   Respiratory:  Negative for apnea, choking and stridor.    Cardiovascular:  Negative for palpitations.   Gastrointestinal:  Negative for abdominal distention, abdominal pain and anal bleeding.   Endocrine: Negative for polydipsia and polyphagia.   Genitourinary:  Negative for decreased urine volume, flank pain and genital sores.   Musculoskeletal:  Negative for gait problem, neck pain and neck stiffness.   Skin:  Negative for color change, rash and wound.   Neurological:  Negative for tremors, seizures, syncope, facial asymmetry and speech difficulty.   Hematological:  Negative for adenopathy.   Psychiatric/Behavioral:  Negative for agitation, confusion, hallucinations and self-injury. The patient is not hyperactive.      Objective:    Vitals:    07/31/23 1213   BP: 131/86   Pulse: 66   Resp: 20   Temp: 98 øF (36.7 øC)   TempSrc: Infrared   SpO2: 97%   Weight: 135 kg (298 lb)   Height: 182.9 cm (72\")   PainSc: 0-No pain     Body mass index is 40.42 kg/mý.    ECOG  (0) Fully active, able to carry on all predisease performance without restriction    Physical Exam:  Physical Exam  Vitals and nursing note reviewed.   Constitutional:       General: He is not in acute distress.     Appearance: He is not diaphoretic.   HENT:      Head: Normocephalic and atraumatic.   Eyes:      General: No scleral icterus.        Right eye: No discharge.         Left eye: No discharge.      Conjunctiva/sclera: Conjunctivae normal.   Neck:      Thyroid: No thyromegaly.   Cardiovascular:      Rate and Rhythm: Normal rate and regular rhythm.      Heart sounds: Normal heart sounds.     No friction rub. No gallop.   Pulmonary:      Effort: Pulmonary effort is normal. No respiratory distress.      Breath sounds: No stridor. No " wheezing.   Abdominal:      General: Bowel sounds are normal.      Palpations: Abdomen is soft. There is no mass.      Tenderness: There is no abdominal tenderness. There is no guarding or rebound.   Musculoskeletal:         General: No tenderness. Normal range of motion.      Cervical back: Normal range of motion and neck supple.   Lymphadenopathy:      Cervical: No cervical adenopathy.   Skin:     General: Skin is warm.      Findings: No erythema or rash.   Neurological:      Mental Status: He is alert and oriented to person, place, and time.      Motor: No abnormal muscle tone.   Psychiatric:         Behavior: Behavior normal.       RECENT LABS  WBC   Date Value Ref Range Status   07/31/2023 8.53 3.40 - 10.80 10*3/mm3 Final   06/02/2022 7.3 3.4 - 10.8 x10E3/uL Final     RBC   Date Value Ref Range Status   07/31/2023 6.38 (H) 4.14 - 5.80 10*6/mm3 Final   06/02/2022 6.11 (H) 4.14 - 5.80 x10E6/uL Final     Hemoglobin   Date Value Ref Range Status   07/31/2023 20.2 (H) 13.0 - 17.7 g/dL Final     Hematocrit   Date Value Ref Range Status   08/01/2023 55.0 (H) 37.5 - 51.0 % Final     MCV   Date Value Ref Range Status   07/31/2023 90.3 79.0 - 97.0 fL Final     MCH   Date Value Ref Range Status   07/31/2023 31.7 26.6 - 33.0 pg Final     MCHC   Date Value Ref Range Status   07/31/2023 35.1 31.5 - 35.7 g/dL Final     RDW   Date Value Ref Range Status   07/31/2023 14.0 12.3 - 15.4 % Final     RDW-SD   Date Value Ref Range Status   07/31/2023 43.9 37.0 - 54.0 fl Final     MPV   Date Value Ref Range Status   07/31/2023 9.7 6.0 - 12.0 fL Final     Platelets   Date Value Ref Range Status   07/31/2023 181 140 - 450 10*3/mm3 Final     Neutrophil %   Date Value Ref Range Status   07/31/2023 59.1 42.7 - 76.0 % Final     Lymphocyte %   Date Value Ref Range Status   07/31/2023 25.7 19.6 - 45.3 % Final     Monocyte %   Date Value Ref Range Status   07/31/2023 13.4 (H) 5.0 - 12.0 % Final     Eosinophil %   Date Value Ref Range Status    07/31/2023 1.6 0.3 - 6.2 % Final     Basophil %   Date Value Ref Range Status   07/31/2023 0.2 0.0 - 1.5 % Final     Immature Grans %   Date Value Ref Range Status   07/03/2023 0.4 0.0 - 0.5 % Final     Neutrophils, Absolute   Date Value Ref Range Status   07/31/2023 5.04 1.70 - 7.00 10*3/mm3 Final     Lymphocytes, Absolute   Date Value Ref Range Status   07/31/2023 2.19 0.70 - 3.10 10*3/mm3 Final     Monocytes, Absolute   Date Value Ref Range Status   07/31/2023 1.14 (H) 0.10 - 0.90 10*3/mm3 Final     Eosinophils, Absolute   Date Value Ref Range Status   07/31/2023 0.14 0.00 - 0.40 10*3/mm3 Final     Basophils, Absolute   Date Value Ref Range Status   07/31/2023 0.02 0.00 - 0.20 10*3/mm3 Final     Immature Grans, Absolute   Date Value Ref Range Status   07/03/2023 0.03 0.00 - 0.05 10*3/mm3 Final     nRBC   Date Value Ref Range Status   07/03/2023 0.0 0.0 - 0.2 /100 WBC Final       Lab Results   Component Value Date    GLUCOSE 93 07/03/2023    BUN 16 07/03/2023    CREATININE 1.22 07/03/2023    EGFRIFNONA 82 01/22/2020    BCR 13.1 07/03/2023    K 3.7 07/03/2023    CO2 28.3 07/03/2023    CALCIUM 9.8 07/03/2023    ALBUMIN 4.5 07/03/2023    LABIL2 1.5 04/17/2019    AST 22 07/03/2023    ALT 19 07/03/2023         Assessment & Plan   Polycythemia  - CBC & Differential  - BCR-ABL FISH  - Erythropoietin  - Vitamin B12  - JAK2 Mutation Analysis, Qual  - Uric Acid  - Lactate Dehydrogenase  - Methylmalonic Acid, Serum  - US Abdomen Limited      Polycythemia rule out myeloproliferative disease.  Discussed the differential diagnosis with patient primary versus secondary.  Suspect this may be secondary secondary to testosterone administration.  Also discussed importance of completing his workup  Testosterone administration likely contributing to the above      Plans    We will begin phlebotomy since his hemoglobin is up to 20 g per DL  Goal be to get him down to 17 g per DL or less  Will work him up for primary polycythemia  versus secondary  Patient will follow-up with his physician for adjustment of his testosterone medication  phlebotomize 250 cc of whole blood q weekly for 3 weeks for hemoglobin of more than 17,   Schedule ultrasound of the abdomen to evaluate for spleen size.   Follow-up 4 weeks   All questions answered    Patient verbalized understanding and is in agreement of the above plan.              I spent 45 total minutes, face-to-face, caring for Anders today. 90% of this time involved counseling and/or coordination of care as documented within this note.

## 2023-08-01 ENCOUNTER — HOSPITAL ENCOUNTER (OUTPATIENT)
Dept: ONCOLOGY | Facility: HOSPITAL | Age: 43
Discharge: HOME | End: 2023-08-01
Admitting: INTERNAL MEDICINE
Payer: OTHER GOVERNMENT

## 2023-08-01 VITALS
WEIGHT: 300 LBS | RESPIRATION RATE: 16 BRPM | HEIGHT: 72 IN | DIASTOLIC BLOOD PRESSURE: 81 MMHG | TEMPERATURE: 98.3 F | SYSTOLIC BLOOD PRESSURE: 123 MMHG | OXYGEN SATURATION: 95 % | BODY MASS INDEX: 40.63 KG/M2 | HEART RATE: 83 BPM

## 2023-08-01 DIAGNOSIS — D75.1 POLYCYTHEMIA: Primary | ICD-10-CM

## 2023-08-01 LAB
FERRITIN SERPL-MCNC: 95.66 NG/ML (ref 30–400)
HCT VFR BLD AUTO: 55 % (ref 37.5–51)

## 2023-08-01 PROCEDURE — 82728 ASSAY OF FERRITIN: CPT | Performed by: INTERNAL MEDICINE

## 2023-08-01 PROCEDURE — 85014 HEMATOCRIT: CPT | Performed by: INTERNAL MEDICINE

## 2023-08-01 PROCEDURE — 36415 COLL VENOUS BLD VENIPUNCTURE: CPT

## 2023-08-01 PROCEDURE — 99195 PHLEBOTOMY: CPT

## 2023-08-08 ENCOUNTER — HOSPITAL ENCOUNTER (OUTPATIENT)
Dept: ONCOLOGY | Facility: HOSPITAL | Age: 43
Discharge: HOME | End: 2023-08-08
Payer: OTHER GOVERNMENT

## 2023-08-08 ENCOUNTER — LAB (OUTPATIENT)
Dept: LAB | Facility: HOSPITAL | Age: 43
End: 2023-08-08
Payer: OTHER GOVERNMENT

## 2023-08-08 VITALS
DIASTOLIC BLOOD PRESSURE: 91 MMHG | SYSTOLIC BLOOD PRESSURE: 138 MMHG | OXYGEN SATURATION: 93 % | HEIGHT: 72 IN | BODY MASS INDEX: 40.77 KG/M2 | RESPIRATION RATE: 16 BRPM | WEIGHT: 301 LBS | HEART RATE: 78 BPM

## 2023-08-08 DIAGNOSIS — D75.1 POLYCYTHEMIA: Primary | ICD-10-CM

## 2023-08-08 DIAGNOSIS — D75.1 POLYCYTHEMIA: ICD-10-CM

## 2023-08-08 LAB
BASOPHILS # BLD AUTO: 0.03 10*3/MM3 (ref 0–0.2)
BASOPHILS NFR BLD AUTO: 0.3 % (ref 0–1.5)
DEPRECATED RDW RBC AUTO: 42.8 FL (ref 37–54)
EOSINOPHIL # BLD AUTO: 0.14 10*3/MM3 (ref 0–0.4)
EOSINOPHIL NFR BLD AUTO: 1.5 % (ref 0.3–6.2)
ERYTHROCYTE [DISTWIDTH] IN BLOOD BY AUTOMATED COUNT: 13.4 % (ref 12.3–15.4)
HCT VFR BLD AUTO: 53.9 % (ref 37.5–51)
HCT VFR BLD AUTO: 54.2 % (ref 37.5–51)
HGB BLD-MCNC: 19.2 G/DL (ref 13–17.7)
LYMPHOCYTES # BLD AUTO: 2.4 10*3/MM3 (ref 0.7–3.1)
LYMPHOCYTES NFR BLD AUTO: 25.9 % (ref 19.6–45.3)
MCH RBC QN AUTO: 31.8 PG (ref 26.6–33)
MCHC RBC AUTO-ENTMCNC: 35.4 G/DL (ref 31.5–35.7)
MCV RBC AUTO: 89.9 FL (ref 79–97)
MONOCYTES # BLD AUTO: 1.18 10*3/MM3 (ref 0.1–0.9)
MONOCYTES NFR BLD AUTO: 12.8 % (ref 5–12)
NEUTROPHILS NFR BLD AUTO: 5.5 10*3/MM3 (ref 1.7–7)
NEUTROPHILS NFR BLD AUTO: 59.5 % (ref 42.7–76)
PLATELET # BLD AUTO: 251 10*3/MM3 (ref 140–450)
PMV BLD AUTO: 9.4 FL (ref 6–12)
RBC # BLD AUTO: 6.03 10*6/MM3 (ref 4.14–5.8)
WBC NRBC COR # BLD: 9.25 10*3/MM3 (ref 3.4–10.8)

## 2023-08-08 PROCEDURE — 85014 HEMATOCRIT: CPT

## 2023-08-08 PROCEDURE — 99195 PHLEBOTOMY: CPT

## 2023-08-08 PROCEDURE — 85025 COMPLETE CBC W/AUTO DIFF WBC: CPT

## 2023-08-10 ENCOUNTER — HOSPITAL ENCOUNTER (OUTPATIENT)
Dept: ULTRASOUND IMAGING | Facility: HOSPITAL | Age: 43
Discharge: HOME | End: 2023-08-10
Admitting: INTERNAL MEDICINE
Payer: OTHER GOVERNMENT

## 2023-08-10 DIAGNOSIS — D75.1 POLYCYTHEMIA: ICD-10-CM

## 2023-08-10 PROCEDURE — 76705 ECHO EXAM OF ABDOMEN: CPT

## 2023-08-15 ENCOUNTER — HOSPITAL ENCOUNTER (OUTPATIENT)
Dept: ONCOLOGY | Facility: HOSPITAL | Age: 43
Discharge: HOME | End: 2023-08-15
Payer: OTHER GOVERNMENT

## 2023-08-15 ENCOUNTER — LAB (OUTPATIENT)
Dept: LAB | Facility: HOSPITAL | Age: 43
End: 2023-08-15
Payer: OTHER GOVERNMENT

## 2023-08-15 VITALS
HEIGHT: 72 IN | BODY MASS INDEX: 39.82 KG/M2 | RESPIRATION RATE: 16 BRPM | SYSTOLIC BLOOD PRESSURE: 123 MMHG | OXYGEN SATURATION: 96 % | TEMPERATURE: 98.1 F | HEART RATE: 82 BPM | WEIGHT: 294 LBS | DIASTOLIC BLOOD PRESSURE: 82 MMHG

## 2023-08-15 DIAGNOSIS — D75.1 POLYCYTHEMIA: Primary | ICD-10-CM

## 2023-08-15 DIAGNOSIS — D75.1 POLYCYTHEMIA: ICD-10-CM

## 2023-08-15 LAB
HCT VFR BLD AUTO: 53.5 % (ref 37.5–51)
HCT VFR BLD AUTO: 53.5 % (ref 37.5–51)
HGB BLD-MCNC: 18.7 G/DL (ref 13–17.7)

## 2023-08-15 PROCEDURE — 99195 PHLEBOTOMY: CPT

## 2023-08-15 PROCEDURE — 85018 HEMOGLOBIN: CPT

## 2023-08-15 PROCEDURE — 85014 HEMATOCRIT: CPT

## 2023-08-23 DIAGNOSIS — K76.0 FATTY LIVER: Primary | ICD-10-CM

## 2023-08-23 NOTE — PROGRESS NOTES
Hematology/Oncology Outpatient Follow Up    PATIENT NAME:Anders Garcai  :1980  MRN: [id number]  PRIMARY CARE PHYSICIAN: Alondra Goodman MD  REFERRING PHYSICIAN: Alondra Goodman MD    Chief Complaint   Patient presents with    Follow-up     Polycythemia        HISTORY OF PRESENT ILLNESS:     This is a 43-year-old male has referred secondary to polycythemia.  Patient has been noted to have polycythemia since 2022.  At that time white count was 5.9, hemoglobin 19.4, MCV was normal at 91 and platelets were 274.  7/3/2023 white count was 7.0 hemoglobin is 19 platelets are 269 with essentially unremarkable differentials.  Today 2023 white count is 8.5, hemoglobin is 20.2 and platelets are 181 with unremarkable differentials.     Patient has been referred due to polycythemia.  He had a chemistry panel about a month ago which was essentially unremarkable with a BUN of 16, creatinine of 1.22.     Patient has been on testosterone 2 times a week since . Patient is on 0.5mls twice weekly.  Otherwise he has no prior history of any hematologic problems.  Has never received blood transfusions in the past.        Patient does not smoke. 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     Family history of cancer both grnadparents      Past Medical History:   Diagnosis Date    Back pain     Hyperlipidemia     Hypertension     Obesity     RMSF ([place] spotted fever)     Vitamin D deficiency        Past Surgical History:   Procedure Laterality Date    KNEE SURGERY Left 2018    2 times    TONSILLECTOMY           Current Outpatient Medications:     ALBUTEROL SULFATE  (90 Base) MCG/ACT inhaler, INHALE 2 PUFFS BY MOUTH EVERY 6 HOURS AS NEEDED FOR WHEEZING, Disp: 25.5 g, Rfl: 0    cyclobenzaprine (FLEXERIL) 10 MG tablet, Take 1 tablet by mouth Every 8 (Eight) Hours As Needed., Disp: , Rfl:     dicyclomine (Bentyl) 10 MG capsule, Take 1 capsule by mouth 2 (Two) Times a Day.,  Disp: 180 capsule, Rfl: 1    losartan-hydrochlorothiazide (HYZAAR) 100-25 MG per tablet, Take 1 tablet by mouth Daily., Disp: 90 tablet, Rfl: 3    Allergies   Allergen Reactions    Tramadol Itching       Family History   Problem Relation Age of Onset    Hypertension Mother        Cancer-related family history is not on file.    Social History     Tobacco Use    Smoking status: Never    Smokeless tobacco: Current     Types: Chew   Substance Use Topics    Alcohol use: Not Currently    Drug use: No       I have reviewed and confirmed the accuracy of the patient's history: Chief complaint, HPI, ROS, and Subjective as entered by the MA/LPN/RN. Ivy Millan MD 08/24/23       SUBJECTIVE:    Patient is here today for routine follow-up and does not have any new issues.  His polycythemia labs were not drawn at the last visit.          REVIEW OF SYSTEMS:    Review of Systems   Constitutional:  Negative for chills, fatigue and fever.   HENT:  Negative for congestion, drooling, ear discharge, rhinorrhea, sinus pressure and tinnitus.    Eyes:  Negative for photophobia, pain and discharge.   Respiratory:  Negative for apnea, choking and stridor.    Cardiovascular:  Negative for palpitations.   Gastrointestinal:  Negative for abdominal distention, abdominal pain and anal bleeding.   Endocrine: Negative for polydipsia and polyphagia.   Genitourinary:  Negative for decreased urine volume, flank pain and genital sores.   Musculoskeletal:  Negative for gait problem, neck pain and neck stiffness.   Skin:  Negative for color change, rash and wound.   Neurological:  Negative for tremors, seizures, syncope, facial asymmetry and speech difficulty.   Hematological:  Negative for adenopathy.   Psychiatric/Behavioral:  Negative for agitation, confusion, hallucinations and self-injury. The patient is not hyperactive.      OBJECTIVE:    Vitals:    08/24/23 1015   BP: 137/90   Pulse: 81   Resp: 18   Temp: 98 øF (36.7 øC)   TempSrc:  "Infrared   SpO2: 95%   Weight: 133 kg (293 lb)   Height: 182.9 cm (72\")   PainSc: 0-No pain     Body mass index is 39.74 kg/mý.    ECOG  (0) Fully active, able to carry on all predisease performance without restriction    Physical Exam  Vitals and nursing note reviewed.   Constitutional:       General: He is not in acute distress.     Appearance: He is not diaphoretic.   HENT:      Head: Normocephalic and atraumatic.   Eyes:      General: No scleral icterus.        Right eye: No discharge.         Left eye: No discharge.      Conjunctiva/sclera: Conjunctivae normal.   Neck:      Thyroid: No thyromegaly.   Cardiovascular:      Rate and Rhythm: Normal rate and regular rhythm.      Heart sounds: Normal heart sounds.     No friction rub. No gallop.   Pulmonary:      Effort: Pulmonary effort is normal. No respiratory distress.      Breath sounds: No stridor. No wheezing.   Abdominal:      General: Bowel sounds are normal.      Palpations: Abdomen is soft. There is no mass.      Tenderness: There is no abdominal tenderness. There is no guarding or rebound.   Musculoskeletal:         General: No tenderness. Normal range of motion.      Cervical back: Normal range of motion and neck supple.   Lymphadenopathy:      Cervical: No cervical adenopathy.   Skin:     General: Skin is warm.      Findings: No erythema or rash.   Neurological:      Mental Status: He is alert and oriented to person, place, and time.      Motor: No abnormal muscle tone.   Psychiatric:         Behavior: Behavior normal.       RECENT LABS    WBC   Date Value Ref Range Status   08/24/2023 6.40 3.40 - 10.80 10*3/mm3 Final   06/02/2022 7.3 3.4 - 10.8 x10E3/uL Final     RBC   Date Value Ref Range Status   08/24/2023 5.66 4.14 - 5.80 10*6/mm3 Final   06/02/2022 6.11 (H) 4.14 - 5.80 x10E6/uL Final     Hemoglobin   Date Value Ref Range Status   08/24/2023 18.1 (H) 13.0 - 17.7 g/dL Final     Hematocrit   Date Value Ref Range Status   08/24/2023 51.8 (H) 37.5 - " 51.0 % Final     MCV   Date Value Ref Range Status   08/24/2023 91.5 79.0 - 97.0 fL Final     MCH   Date Value Ref Range Status   08/24/2023 32.0 26.6 - 33.0 pg Final     MCHC   Date Value Ref Range Status   08/24/2023 34.9 31.5 - 35.7 g/dL Final     RDW   Date Value Ref Range Status   08/24/2023 13.3 12.3 - 15.4 % Final     RDW-SD   Date Value Ref Range Status   08/24/2023 44.1 37.0 - 54.0 fl Final     MPV   Date Value Ref Range Status   08/24/2023 9.2 6.0 - 12.0 fL Final     Platelets   Date Value Ref Range Status   08/24/2023 257 140 - 450 10*3/mm3 Final     Neutrophil %   Date Value Ref Range Status   08/24/2023 58.3 42.7 - 76.0 % Final     Lymphocyte %   Date Value Ref Range Status   08/24/2023 28.4 19.6 - 45.3 % Final     Monocyte %   Date Value Ref Range Status   08/24/2023 11.4 5.0 - 12.0 % Final     Eosinophil %   Date Value Ref Range Status   08/24/2023 1.7 0.3 - 6.2 % Final     Basophil %   Date Value Ref Range Status   08/24/2023 0.2 0.0 - 1.5 % Final     Immature Grans %   Date Value Ref Range Status   07/03/2023 0.4 0.0 - 0.5 % Final     Neutrophils, Absolute   Date Value Ref Range Status   08/24/2023 3.73 1.70 - 7.00 10*3/mm3 Final     Lymphocytes, Absolute   Date Value Ref Range Status   08/24/2023 1.82 0.70 - 3.10 10*3/mm3 Final     Monocytes, Absolute   Date Value Ref Range Status   08/24/2023 0.73 0.10 - 0.90 10*3/mm3 Final     Eosinophils, Absolute   Date Value Ref Range Status   08/24/2023 0.11 0.00 - 0.40 10*3/mm3 Final     Basophils, Absolute   Date Value Ref Range Status   08/24/2023 0.01 0.00 - 0.20 10*3/mm3 Final     Immature Grans, Absolute   Date Value Ref Range Status   07/03/2023 0.03 0.00 - 0.05 10*3/mm3 Final     nRBC   Date Value Ref Range Status   07/03/2023 0.0 0.0 - 0.2 /100 WBC Final       Lab Results   Component Value Date    GLUCOSE 93 07/03/2023    BUN 16 07/03/2023    CREATININE 1.22 07/03/2023    EGFRIFNONA 82 01/22/2020    BCR 13.1 07/03/2023    K 3.7 07/03/2023    CO2 28.3  07/03/2023    CALCIUM 9.8 07/03/2023    ALBUMIN 4.5 07/03/2023    LABIL2 1.5 04/17/2019    AST 22 07/03/2023    ALT 19 07/03/2023         Assessment & Plan     Polycythemia  - CBC & Differential  - BCR-ABL FISH  - Erythropoietin  - Vitamin B12  - JAK2 Mutation Analysis, Qual  - Uric Acid  - Lactate Dehydrogenase      ASSESSMENT:      Polycythemia        Polycythemia rule out myeloproliferative disease.  Discussed the differential diagnosis with patient primary versus secondary.  Suspect this may be secondary secondary to testosterone administration.  We will complete his systemic work-up today  Fatty liver: Referred to GI.  Discussed dietary restrictions and fat  Testosterone administration likely contributing to the above.  Patient has already cut back through the advice of his prescribing physician        Plans     Continue phlebotomy to hemoglobin dropped down to 17 g per DL  Goal be to get him down to 17 g per DL or less  Will work him up for primary polycythemia versus secondary to today  Patient will follow-up with his physician for adjustment of his testosterone medication  phlebotomize 250 cc of whole blood q weekly for 3 weeks for hemoglobin of more than 17,   Schedule ultrasound of the abdomen to evaluate for spleen size.   Follow-up 4 weeks or earlier as needed  Follow-up with GI for fatty liver  All questions answered     Patient verbalized understanding and is in agreement of the above plan.              I spent 30 total minutes, face-to-face, caring for Anders today. 90% of this time involved counseling and/or coordination of care as documented within this note.

## 2023-08-24 ENCOUNTER — OFFICE VISIT (OUTPATIENT)
Dept: ONCOLOGY | Facility: CLINIC | Age: 43
End: 2023-08-24
Payer: OTHER GOVERNMENT

## 2023-08-24 ENCOUNTER — LAB (OUTPATIENT)
Dept: LAB | Facility: HOSPITAL | Age: 43
End: 2023-08-24
Payer: OTHER GOVERNMENT

## 2023-08-24 VITALS
BODY MASS INDEX: 39.68 KG/M2 | DIASTOLIC BLOOD PRESSURE: 90 MMHG | HEART RATE: 81 BPM | OXYGEN SATURATION: 95 % | HEIGHT: 72 IN | SYSTOLIC BLOOD PRESSURE: 137 MMHG | TEMPERATURE: 98 F | WEIGHT: 293 LBS | RESPIRATION RATE: 18 BRPM

## 2023-08-24 DIAGNOSIS — D75.1 POLYCYTHEMIA: Primary | ICD-10-CM

## 2023-08-24 LAB
BASOPHILS # BLD AUTO: 0.01 10*3/MM3 (ref 0–0.2)
BASOPHILS NFR BLD AUTO: 0.2 % (ref 0–1.5)
DEPRECATED RDW RBC AUTO: 44.1 FL (ref 37–54)
EOSINOPHIL # BLD AUTO: 0.11 10*3/MM3 (ref 0–0.4)
EOSINOPHIL NFR BLD AUTO: 1.7 % (ref 0.3–6.2)
ERYTHROCYTE [DISTWIDTH] IN BLOOD BY AUTOMATED COUNT: 13.3 % (ref 12.3–15.4)
HCT VFR BLD AUTO: 51.8 % (ref 37.5–51)
HGB BLD-MCNC: 18.1 G/DL (ref 13–17.7)
HOLD SPECIMEN: NORMAL
LDH SERPL-CCNC: 156 U/L (ref 135–225)
LYMPHOCYTES # BLD AUTO: 1.82 10*3/MM3 (ref 0.7–3.1)
LYMPHOCYTES NFR BLD AUTO: 28.4 % (ref 19.6–45.3)
MCH RBC QN AUTO: 32 PG (ref 26.6–33)
MCHC RBC AUTO-ENTMCNC: 34.9 G/DL (ref 31.5–35.7)
MCV RBC AUTO: 91.5 FL (ref 79–97)
MONOCYTES # BLD AUTO: 0.73 10*3/MM3 (ref 0.1–0.9)
MONOCYTES NFR BLD AUTO: 11.4 % (ref 5–12)
NEUTROPHILS NFR BLD AUTO: 3.73 10*3/MM3 (ref 1.7–7)
NEUTROPHILS NFR BLD AUTO: 58.3 % (ref 42.7–76)
PLATELET # BLD AUTO: 257 10*3/MM3 (ref 140–450)
PMV BLD AUTO: 9.2 FL (ref 6–12)
RBC # BLD AUTO: 5.66 10*6/MM3 (ref 4.14–5.8)
URATE SERPL-MCNC: 6.3 MG/DL (ref 3.4–7)
VIT B12 BLD-MCNC: 567 PG/ML (ref 211–946)
WBC NRBC COR # BLD: 6.4 10*3/MM3 (ref 3.4–10.8)

## 2023-08-24 PROCEDURE — 85025 COMPLETE CBC W/AUTO DIFF WBC: CPT

## 2023-08-24 PROCEDURE — 84550 ASSAY OF BLOOD/URIC ACID: CPT | Performed by: INTERNAL MEDICINE

## 2023-08-24 PROCEDURE — 36415 COLL VENOUS BLD VENIPUNCTURE: CPT

## 2023-08-24 PROCEDURE — 82607 VITAMIN B-12: CPT | Performed by: INTERNAL MEDICINE

## 2023-08-24 PROCEDURE — 83615 LACTATE (LD) (LDH) ENZYME: CPT | Performed by: INTERNAL MEDICINE

## 2023-08-25 LAB — EPO SERPL-ACNC: 10.7 MIU/ML (ref 2.6–18.5)

## 2023-08-28 ENCOUNTER — TELEPHONE (OUTPATIENT)
Dept: ONCOLOGY | Facility: CLINIC | Age: 43
End: 2023-08-28

## 2023-08-28 NOTE — TELEPHONE ENCOUNTER
Caller: Anders Garcia    Relationship to patient: Self    Best call back number:[phone]    Chief complaint: R/S    Type of visit: LAB AND PHLEBOTOMY     Requested date: 8-30 OR 9-1 MORNING PREFERRED     If rescheduling, when is the original appointment: 8-31    Additional notes:

## 2023-08-29 LAB
CELLS ANALYZED: 200
CELLS COUNTED: 200
CHROM ANALY RESULT (ISCN): NORMAL
CLINICAL CYTOGENETICIST SPEC: NORMAL
DIAGNOSTIC IMP SPEC-IMP: NORMAL
JAK2 P.V617F BLD/T QL: NORMAL
LAB DIRECTOR NAME PROVIDER: NORMAL
LABORATORY COMMENT REPORT: NORMAL
SPECIMEN SOURCE: NORMAL

## 2023-09-01 ENCOUNTER — LAB (OUTPATIENT)
Dept: LAB | Facility: HOSPITAL | Age: 43
End: 2023-09-01
Payer: OTHER GOVERNMENT

## 2023-09-01 ENCOUNTER — HOSPITAL ENCOUNTER (OUTPATIENT)
Dept: ONCOLOGY | Facility: HOSPITAL | Age: 43
Discharge: HOME | End: 2023-09-01
Payer: OTHER GOVERNMENT

## 2023-09-01 VITALS
SYSTOLIC BLOOD PRESSURE: 115 MMHG | BODY MASS INDEX: 39.96 KG/M2 | HEIGHT: 72 IN | DIASTOLIC BLOOD PRESSURE: 76 MMHG | HEART RATE: 76 BPM | OXYGEN SATURATION: 96 % | TEMPERATURE: 97.6 F | WEIGHT: 295 LBS | RESPIRATION RATE: 16 BRPM

## 2023-09-01 DIAGNOSIS — D75.1 POLYCYTHEMIA: Primary | ICD-10-CM

## 2023-09-01 DIAGNOSIS — D75.1 POLYCYTHEMIA: ICD-10-CM

## 2023-09-01 LAB
BASOPHILS # BLD AUTO: 0.03 10*3/MM3 (ref 0–0.2)
BASOPHILS NFR BLD AUTO: 0.5 % (ref 0–1.5)
DEPRECATED RDW RBC AUTO: 42.8 FL (ref 37–54)
EOSINOPHIL # BLD AUTO: 0.14 10*3/MM3 (ref 0–0.4)
EOSINOPHIL NFR BLD AUTO: 2.1 % (ref 0.3–6.2)
ERYTHROCYTE [DISTWIDTH] IN BLOOD BY AUTOMATED COUNT: 13.1 % (ref 12.3–15.4)
HCT VFR BLD AUTO: 51.6 % (ref 37.5–51)
HCT VFR BLD AUTO: 52 % (ref 37.5–51)
HGB BLD-MCNC: 18.3 G/DL (ref 13–17.7)
LYMPHOCYTES # BLD AUTO: 1.75 10*3/MM3 (ref 0.7–3.1)
LYMPHOCYTES NFR BLD AUTO: 26.4 % (ref 19.6–45.3)
MCH RBC QN AUTO: 31.9 PG (ref 26.6–33)
MCHC RBC AUTO-ENTMCNC: 35.2 G/DL (ref 31.5–35.7)
MCV RBC AUTO: 90.8 FL (ref 79–97)
MONOCYTES # BLD AUTO: 0.9 10*3/MM3 (ref 0.1–0.9)
MONOCYTES NFR BLD AUTO: 13.6 % (ref 5–12)
NEUTROPHILS NFR BLD AUTO: 3.8 10*3/MM3 (ref 1.7–7)
NEUTROPHILS NFR BLD AUTO: 57.4 % (ref 42.7–76)
PLATELET # BLD AUTO: 241 10*3/MM3 (ref 140–450)
PMV BLD AUTO: 9.4 FL (ref 6–12)
RBC # BLD AUTO: 5.73 10*6/MM3 (ref 4.14–5.8)
WBC NRBC COR # BLD: 6.62 10*3/MM3 (ref 3.4–10.8)

## 2023-09-01 PROCEDURE — 85025 COMPLETE CBC W/AUTO DIFF WBC: CPT

## 2023-09-01 PROCEDURE — 99195 PHLEBOTOMY: CPT

## 2023-09-01 PROCEDURE — 85014 HEMATOCRIT: CPT

## 2023-09-01 NOTE — PROGRESS NOTES
Pt. Was here for phlebotomy appointment.   Pt. Has no complaints today. Pt's HGB 18.3 today.   Phlebotomy performed as ordered and pt. Tolerated well.   Pt. Discharged from clinic with no complaints and pt. States he has a copy of his schedule.

## 2023-09-07 ENCOUNTER — HOSPITAL ENCOUNTER (OUTPATIENT)
Dept: ONCOLOGY | Facility: HOSPITAL | Age: 43
Discharge: HOME | End: 2023-09-07
Payer: OTHER GOVERNMENT

## 2023-09-07 ENCOUNTER — LAB (OUTPATIENT)
Dept: LAB | Facility: HOSPITAL | Age: 43
End: 2023-09-07
Payer: OTHER GOVERNMENT

## 2023-09-07 VITALS
HEIGHT: 72 IN | HEART RATE: 91 BPM | OXYGEN SATURATION: 95 % | BODY MASS INDEX: 39.55 KG/M2 | SYSTOLIC BLOOD PRESSURE: 148 MMHG | RESPIRATION RATE: 16 BRPM | WEIGHT: 292 LBS | DIASTOLIC BLOOD PRESSURE: 98 MMHG

## 2023-09-07 DIAGNOSIS — D75.1 POLYCYTHEMIA: Primary | ICD-10-CM

## 2023-09-07 DIAGNOSIS — D75.1 POLYCYTHEMIA: ICD-10-CM

## 2023-09-07 LAB
HCT VFR BLD AUTO: 51.3 % (ref 37.5–51)
HCT VFR BLD AUTO: 51.3 % (ref 37.5–51)
HGB BLD-MCNC: 17.8 G/DL (ref 13–17.7)

## 2023-09-07 PROCEDURE — 99195 PHLEBOTOMY: CPT

## 2023-09-07 PROCEDURE — 85014 HEMATOCRIT: CPT

## 2023-09-07 PROCEDURE — 85018 HEMOGLOBIN: CPT

## 2023-09-15 NOTE — PROGRESS NOTES
Hematology/Oncology Outpatient Follow Up    PATIENT NAME:Anders Garcia  :1980  MRN: [id number]  PRIMARY CARE PHYSICIAN: Alondra Goodman MD  REFERRING PHYSICIAN: Alondra Goodman MD    Chief Complaint   Patient presents with    Follow-up     Polycythemia        HISTORY OF PRESENT ILLNESS:     This is a 43-year-old male has referred secondary to polycythemia.  Patient has been noted to have polycythemia since 2022.  At that time white count was 5.9, hemoglobin 19.4, MCV was normal at 91 and platelets were 274.  7/3/2023 white count was 7.0 hemoglobin is 19 platelets are 269 with essentially unremarkable differentials.  Today 2023 white count is 8.5, hemoglobin is 20.2 and platelets are 181 with unremarkable differentials.     Patient has been referred due to polycythemia.  He had a chemistry panel about a month ago which was essentially unremarkable with a BUN of 16, creatinine of 1.22.     Patient has been on testosterone 2 times a week since . Patient is on 0.5mls twice weekly.  Otherwise he has no prior history of any hematologic problems.  Has never received blood transfusions in the past.        Patient does not smoke. 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)     Vitamin D deficiency        Past Surgical History:   Procedure Laterality Date    KNEE SURGERY Left     2 times    TONSILLECTOMY           Current Outpatient Medications:     ALBUTEROL SULFATE  (90 Base) MCG/ACT inhaler, INHALE 2 PUFFS BY MOUTH EVERY 6 HOURS AS NEEDED FOR WHEEZING, Disp:  25.5 g, Rfl: 0    cyclobenzaprine (FLEXERIL) 10 MG tablet, Take 1 tablet by mouth Every 8 (Eight) Hours As Needed., Disp: , Rfl:     dicyclomine (Bentyl) 10 MG capsule, Take 1 capsule by mouth 2 (Two) Times a Day., Disp: 180 capsule, Rfl: 1    losartan-hydrochlorothiazide (HYZAAR) 100-25 MG per tablet, Take 1 tablet by mouth Daily., Disp: 90 tablet, Rfl: 3    Allergies   Allergen Reactions    Tramadol Itching       Family History   Problem Relation Age of Onset    Hypertension Mother        Cancer-related family history is not on file.    Social History     Tobacco Use    Smoking status: Never    Smokeless tobacco: Current     Types: Chew   Substance Use Topics    Alcohol use: Not Currently    Drug use: No       I have reviewed and confirmed the accuracy of the patient's history: Chief complaint, HPI, ROS, and Subjective as entered by the MA/LPN/RN. Ivy Millan MD 09/21/23        SUBJECTIVE:    He has  tolerated phlebotomies without any difficulty.  He is back on testosterone injection and does not have any new issues today.          REVIEW OF SYSTEMS:    Review of Systems   Constitutional:  Negative for chills, fatigue and fever.   HENT:  Negative for congestion, drooling, ear discharge, rhinorrhea, sinus pressure and tinnitus.    Eyes:  Negative for photophobia, pain and discharge.   Respiratory:  Negative for apnea, choking and stridor.    Cardiovascular:  Negative for palpitations.   Gastrointestinal:  Negative for abdominal distention, abdominal pain and anal bleeding.   Endocrine: Negative for polydipsia and polyphagia.   Genitourinary:  Negative for decreased urine volume, flank pain and genital sores.   Musculoskeletal:  Negative for gait problem, neck pain and neck stiffness.   Skin:  Negative for color change, rash and wound.   Neurological:  Negative for tremors, seizures, syncope, facial asymmetry and speech difficulty.   Hematological:  Negative for adenopathy.   Psychiatric/Behavioral:   "Negative for agitation, confusion, hallucinations and self-injury. The patient is not hyperactive.      OBJECTIVE:    Vitals:    09/21/23 0944   BP: 138/91   Pulse: 82   Resp: 18   Temp: 98 °F (36.7 °C)   TempSrc: Infrared   SpO2: 97%   Weight: 132 kg (290 lb)   Height: 182.9 cm (72\")   PainSc: 0-No pain     Body mass index is 39.33 kg/m².    ECOG  (0) Fully active, able to carry on all predisease performance without restriction    Physical Exam  Vitals and nursing note reviewed.   Constitutional:       General: He is not in acute distress.     Appearance: He is not diaphoretic.   HENT:      Head: Normocephalic and atraumatic.   Eyes:      General: No scleral icterus.        Right eye: No discharge.         Left eye: No discharge.      Conjunctiva/sclera: Conjunctivae normal.   Neck:      Thyroid: No thyromegaly.   Cardiovascular:      Rate and Rhythm: Normal rate and regular rhythm.      Heart sounds: Normal heart sounds.     No friction rub. No gallop.   Pulmonary:      Effort: Pulmonary effort is normal. No respiratory distress.      Breath sounds: No stridor. No wheezing.   Abdominal:      General: Bowel sounds are normal.      Palpations: Abdomen is soft. There is no mass.      Tenderness: There is no abdominal tenderness. There is no guarding or rebound.   Musculoskeletal:         General: No tenderness. Normal range of motion.      Cervical back: Normal range of motion and neck supple.   Lymphadenopathy:      Cervical: No cervical adenopathy.   Skin:     General: Skin is warm.      Findings: No erythema or rash.   Neurological:      Mental Status: He is alert and oriented to person, place, and time.      Motor: No abnormal muscle tone.   Psychiatric:         Behavior: Behavior normal.     I have reexamined the patient and the results are consistent with the previously documented exam. Ivy Millan MD      RECENT LABS    WBC   Date Value Ref Range Status   09/21/2023 5.79 3.40 - 10.80 10*3/mm3 Final "   06/02/2022 7.3 3.4 - 10.8 x10E3/uL Final     RBC   Date Value Ref Range Status   09/21/2023 5.37 4.14 - 5.80 10*6/mm3 Final   06/02/2022 6.11 (H) 4.14 - 5.80 x10E6/uL Final     Hemoglobin   Date Value Ref Range Status   09/21/2023 16.7 13.0 - 17.7 g/dL Final     Hematocrit   Date Value Ref Range Status   09/21/2023 49.6 37.5 - 51.0 % Final     MCV   Date Value Ref Range Status   09/21/2023 92.4 79.0 - 97.0 fL Final     MCH   Date Value Ref Range Status   09/21/2023 31.1 26.6 - 33.0 pg Final     MCHC   Date Value Ref Range Status   09/21/2023 33.7 31.5 - 35.7 g/dL Final     RDW   Date Value Ref Range Status   09/21/2023 12.8 12.3 - 15.4 % Final     RDW-SD   Date Value Ref Range Status   09/21/2023 42.7 37.0 - 54.0 fl Final     MPV   Date Value Ref Range Status   09/21/2023 9.0 6.0 - 12.0 fL Final     Platelets   Date Value Ref Range Status   09/21/2023 255 140 - 450 10*3/mm3 Final     Neutrophil %   Date Value Ref Range Status   09/21/2023 61.2 42.7 - 76.0 % Final     Lymphocyte %   Date Value Ref Range Status   09/21/2023 28.5 19.6 - 45.3 % Final     Monocyte %   Date Value Ref Range Status   09/21/2023 7.9 5.0 - 12.0 % Final     Eosinophil %   Date Value Ref Range Status   09/21/2023 2.1 0.3 - 6.2 % Final     Basophil %   Date Value Ref Range Status   09/21/2023 0.3 0.0 - 1.5 % Final     Immature Grans %   Date Value Ref Range Status   07/03/2023 0.4 0.0 - 0.5 % Final     Neutrophils, Absolute   Date Value Ref Range Status   09/21/2023 3.54 1.70 - 7.00 10*3/mm3 Final     Lymphocytes, Absolute   Date Value Ref Range Status   09/21/2023 1.65 0.70 - 3.10 10*3/mm3 Final     Monocytes, Absolute   Date Value Ref Range Status   09/21/2023 0.46 0.10 - 0.90 10*3/mm3 Final     Eosinophils, Absolute   Date Value Ref Range Status   09/21/2023 0.12 0.00 - 0.40 10*3/mm3 Final     Basophils, Absolute   Date Value Ref Range Status   09/21/2023 0.02 0.00 - 0.20 10*3/mm3 Final     Immature Grans, Absolute   Date Value Ref Range  Status   07/03/2023 0.03 0.00 - 0.05 10*3/mm3 Final     nRBC   Date Value Ref Range Status   07/03/2023 0.0 0.0 - 0.2 /100 WBC Final       Lab Results   Component Value Date    GLUCOSE 93 07/03/2023    BUN 16 07/03/2023    CREATININE 1.22 07/03/2023    EGFRIFNONA 82 01/22/2020    BCR 13.1 07/03/2023    K 3.7 07/03/2023    CO2 28.3 07/03/2023    CALCIUM 9.8 07/03/2023    ALBUMIN 4.5 07/03/2023    LABIL2 1.5 04/17/2019    AST 22 07/03/2023    ALT 19 07/03/2023         Assessment & Plan     Polycythemia  - CBC & Differential      ASSESSMENT:      Polycythemia        Polycythemia rule out myeloproliferative disease.  Discussed the differential diagnosis with patient primary versus secondary.  Suspect this may be secondary secondary to testosterone administration.  Work-up was essentially negative for primary myeloproliferative disease patient has also responded to phlebotomies his hemoglobin is down to 16 g per DL therefore we will hold off on phlebotomies at this time  Fatty liver: Referred to GI.  Patient will follow-up with GI  Testosterone administration likely contributing to the above.  Patient has already cut back through the advice of his prescribing physician        Plans     Hold phlebotomy for now  Follow-up in 3 months  Call earlier as needed for problems  Patient will follow-up with his physician for adjustment of his testosterone medication  phlebotomize 250 cc of whole blood q weekly for 3 weeks for hemoglobin of more than 17,   Schedule ultrasound of the abdomen to evaluate for spleen size.  Reviewed he has normal spleen size  Follow-up with GI for fatty liver  All questions answered     Patient verbalized understanding and is in agreement of the above plan.               I spent 30 total minutes, face-to-face, caring for Anders today. 90% of this time involved counseling and/or coordination of care as documented within this note.

## 2023-09-21 ENCOUNTER — LAB (OUTPATIENT)
Dept: LAB | Facility: HOSPITAL | Age: 43
End: 2023-09-21

## 2023-09-21 ENCOUNTER — OFFICE VISIT (OUTPATIENT)
Dept: ONCOLOGY | Facility: CLINIC | Age: 43
End: 2023-09-21
Payer: OTHER GOVERNMENT

## 2023-09-21 VITALS
DIASTOLIC BLOOD PRESSURE: 91 MMHG | SYSTOLIC BLOOD PRESSURE: 138 MMHG | HEART RATE: 82 BPM | RESPIRATION RATE: 18 BRPM | BODY MASS INDEX: 39.28 KG/M2 | OXYGEN SATURATION: 97 % | TEMPERATURE: 98 F | WEIGHT: 290 LBS | HEIGHT: 72 IN

## 2023-09-21 DIAGNOSIS — D75.1 POLYCYTHEMIA: Primary | ICD-10-CM

## 2023-09-21 LAB
BASOPHILS # BLD AUTO: 0.02 10*3/MM3 (ref 0–0.2)
BASOPHILS NFR BLD AUTO: 0.3 % (ref 0–1.5)
DEPRECATED RDW RBC AUTO: 42.7 FL (ref 37–54)
EOSINOPHIL # BLD AUTO: 0.12 10*3/MM3 (ref 0–0.4)
EOSINOPHIL NFR BLD AUTO: 2.1 % (ref 0.3–6.2)
ERYTHROCYTE [DISTWIDTH] IN BLOOD BY AUTOMATED COUNT: 12.8 % (ref 12.3–15.4)
FERRITIN SERPL-MCNC: 57.19 NG/ML (ref 30–400)
HCT VFR BLD AUTO: 49.6 % (ref 37.5–51)
HGB BLD-MCNC: 16.7 G/DL (ref 13–17.7)
HOLD SPECIMEN: NORMAL
HOLD SPECIMEN: NORMAL
LYMPHOCYTES # BLD AUTO: 1.65 10*3/MM3 (ref 0.7–3.1)
LYMPHOCYTES NFR BLD AUTO: 28.5 % (ref 19.6–45.3)
MCH RBC QN AUTO: 31.1 PG (ref 26.6–33)
MCHC RBC AUTO-ENTMCNC: 33.7 G/DL (ref 31.5–35.7)
MCV RBC AUTO: 92.4 FL (ref 79–97)
MONOCYTES # BLD AUTO: 0.46 10*3/MM3 (ref 0.1–0.9)
MONOCYTES NFR BLD AUTO: 7.9 % (ref 5–12)
NEUTROPHILS NFR BLD AUTO: 3.54 10*3/MM3 (ref 1.7–7)
NEUTROPHILS NFR BLD AUTO: 61.2 % (ref 42.7–76)
PLATELET # BLD AUTO: 255 10*3/MM3 (ref 140–450)
PMV BLD AUTO: 9 FL (ref 6–12)
RBC # BLD AUTO: 5.37 10*6/MM3 (ref 4.14–5.8)
WBC NRBC COR # BLD: 5.79 10*3/MM3 (ref 3.4–10.8)

## 2023-09-21 PROCEDURE — 85025 COMPLETE CBC W/AUTO DIFF WBC: CPT

## 2023-09-21 PROCEDURE — 36415 COLL VENOUS BLD VENIPUNCTURE: CPT

## 2023-09-21 PROCEDURE — 82728 ASSAY OF FERRITIN: CPT | Performed by: INTERNAL MEDICINE

## 2023-12-21 NOTE — PROGRESS NOTES
Hematology/Oncology Outpatient Follow Up    PATIENT NAME:Anders Garcia  :1980  MRN: [id number]  PRIMARY CARE PHYSICIAN: Alondra Goodman MD  REFERRING PHYSICIAN: No ref. provider found    Chief Complaint   Patient presents with    Follow-up     Polycythemia        HISTORY OF PRESENT ILLNESS:       This is a 43-year-old male has referred secondary to polycythemia.  Patient has been noted to have polycythemia since 2022.  At that time white count was 5.9, hemoglobin 19.4, MCV was normal at 91 and platelets were 274.  7/3/2023 white count was 7.0 hemoglobin is 19 platelets are 269 with essentially unremarkable differentials.  Today 2023 white count is 8.5, hemoglobin is 20.2 and platelets are 181 with unremarkable differentials.     Patient has been referred due to polycythemia.  He had a chemistry panel about a month ago which was essentially unremarkable with a BUN of 16, creatinine of 1.22.     Patient has been on testosterone 2 times a week since . Patient is on 0.5mls twice weekly.  Otherwise he has no prior history of any hematologic problems.  Has never received blood transfusions in the past.        Patient does not smoke. 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)     Vitamin D deficiency        Past Surgical History:   Procedure Laterality Date    KNEE SURGERY Left 2018    2 times    TONSILLECTOMY           Current Outpatient Medications:     ALBUTEROL SULFATE  (90 Base) MCG/ACT inhaler, INHALE 2 PUFFS BY MOUTH EVERY 6 HOURS AS NEEDED FOR WHEEZING,  Disp: 25.5 g, Rfl: 0    cyclobenzaprine (FLEXERIL) 10 MG tablet, Take 1 tablet by mouth Every 8 (Eight) Hours As Needed., Disp: , Rfl:     dicyclomine (Bentyl) 10 MG capsule, Take 1 capsule by mouth 2 (Two) Times a Day., Disp: 180 capsule, Rfl: 1    losartan-hydrochlorothiazide (HYZAAR) 100-25 MG per tablet, Take 1 tablet by mouth Daily., Disp: 90 tablet, Rfl: 3    Allergies   Allergen Reactions    Tramadol Itching       Family History   Problem Relation Age of Onset    Hypertension Mother        Cancer-related family history is not on file.    Social History     Tobacco Use    Smoking status: Never    Smokeless tobacco: Current     Types: Chew   Substance Use Topics    Alcohol use: Not Currently    Drug use: No         I have reviewed and confirmed the accuracy of the patient's history: Chief complaint, HPI, ROS, and Subjective as entered by the MA/LPN/RN. Ivy Millan MD 12/22/23        SUBJECTIVE:    Patient is here today for routine follow-up and does not have any new issues.  He remains on testosterone injections.          REVIEW OF SYSTEMS:    Review of Systems   Constitutional:  Negative for chills, fatigue and fever.   HENT:  Negative for congestion, drooling, ear discharge, rhinorrhea, sinus pressure and tinnitus.    Eyes:  Negative for photophobia, pain and discharge.   Respiratory:  Negative for apnea, choking and stridor.    Cardiovascular:  Negative for palpitations.   Gastrointestinal:  Negative for abdominal distention, abdominal pain and anal bleeding.   Endocrine: Negative for polydipsia and polyphagia.   Genitourinary:  Negative for decreased urine volume, flank pain and genital sores.   Musculoskeletal:  Negative for gait problem, neck pain and neck stiffness.   Skin:  Negative for color change, rash and wound.   Neurological:  Negative for tremors, seizures, syncope, facial asymmetry and speech difficulty.   Hematological:  Negative for adenopathy.   Psychiatric/Behavioral:  Negative  "for agitation, confusion, hallucinations and self-injury. The patient is not hyperactive.        OBJECTIVE:    Vitals:    12/22/23 0955   BP: 142/84   Pulse: 77   Resp: 18   Temp: 98 °F (36.7 °C)   TempSrc: Infrared   SpO2: 95%   Weight: 136 kg (299 lb)   Height: 182.9 cm (72\")   PainSc: 0-No pain       Body mass index is 40.55 kg/m².      ECOG    (0) Fully active, able to carry on all predisease performance without restriction    Physical Exam  Vitals and nursing note reviewed.   Constitutional:       General: He is not in acute distress.     Appearance: He is not diaphoretic.   HENT:      Head: Normocephalic and atraumatic.   Eyes:      General: No scleral icterus.        Right eye: No discharge.         Left eye: No discharge.      Conjunctiva/sclera: Conjunctivae normal.   Neck:      Thyroid: No thyromegaly.   Cardiovascular:      Rate and Rhythm: Normal rate and regular rhythm.      Heart sounds: Normal heart sounds.      No friction rub. No gallop.   Pulmonary:      Effort: Pulmonary effort is normal. No respiratory distress.      Breath sounds: No stridor. No wheezing.   Abdominal:      General: Bowel sounds are normal.      Palpations: Abdomen is soft. There is no mass.      Tenderness: There is no abdominal tenderness. There is no guarding or rebound.   Musculoskeletal:         General: No tenderness. Normal range of motion.      Cervical back: Normal range of motion and neck supple.   Lymphadenopathy:      Cervical: No cervical adenopathy.   Skin:     General: Skin is warm.      Findings: No erythema or rash.   Neurological:      Mental Status: He is alert and oriented to person, place, and time.      Motor: No abnormal muscle tone.   Psychiatric:         Behavior: Behavior normal.         I have reexamined the patient and the results are consistent with the previously documented exam. Ivy Millan MD        RECENT LABS    WBC   Date Value Ref Range Status   12/22/2023 6.63 3.40 - 10.80 10*3/mm3 " Final   06/02/2022 7.3 3.4 - 10.8 x10E3/uL Final     RBC   Date Value Ref Range Status   12/22/2023 6.10 (H) 4.14 - 5.80 10*6/mm3 Final   06/02/2022 6.11 (H) 4.14 - 5.80 x10E6/uL Final     Hemoglobin   Date Value Ref Range Status   12/22/2023 18.6 (H) 13.0 - 17.7 g/dL Final     Hematocrit   Date Value Ref Range Status   12/22/2023 55.9 (H) 37.5 - 51.0 % Final     MCV   Date Value Ref Range Status   12/22/2023 91.6 79.0 - 97.0 fL Final     MCH   Date Value Ref Range Status   12/22/2023 30.5 26.6 - 33.0 pg Final     MCHC   Date Value Ref Range Status   12/22/2023 33.3 31.5 - 35.7 g/dL Final     RDW   Date Value Ref Range Status   12/22/2023 13.9 12.3 - 15.4 % Final     RDW-SD   Date Value Ref Range Status   12/22/2023 46.0 37.0 - 54.0 fl Final     MPV   Date Value Ref Range Status   12/22/2023 9.5 6.0 - 12.0 fL Final     Platelets   Date Value Ref Range Status   12/22/2023 236 140 - 450 10*3/mm3 Final     Neutrophil %   Date Value Ref Range Status   12/22/2023 56.0 42.7 - 76.0 % Final     Lymphocyte %   Date Value Ref Range Status   12/22/2023 31.1 19.6 - 45.3 % Final     Monocyte %   Date Value Ref Range Status   12/22/2023 10.3 5.0 - 12.0 % Final     Eosinophil %   Date Value Ref Range Status   12/22/2023 2.1 0.3 - 6.2 % Final     Basophil %   Date Value Ref Range Status   12/22/2023 0.5 0.0 - 1.5 % Final     Immature Grans %   Date Value Ref Range Status   07/03/2023 0.4 0.0 - 0.5 % Final     Neutrophils, Absolute   Date Value Ref Range Status   12/22/2023 3.72 1.70 - 7.00 10*3/mm3 Final     Lymphocytes, Absolute   Date Value Ref Range Status   12/22/2023 2.06 0.70 - 3.10 10*3/mm3 Final     Monocytes, Absolute   Date Value Ref Range Status   12/22/2023 0.68 0.10 - 0.90 10*3/mm3 Final     Eosinophils, Absolute   Date Value Ref Range Status   12/22/2023 0.14 0.00 - 0.40 10*3/mm3 Final     Basophils, Absolute   Date Value Ref Range Status   12/22/2023 0.03 0.00 - 0.20 10*3/mm3 Final     Immature Grans, Absolute   Date  Value Ref Range Status   07/03/2023 0.03 0.00 - 0.05 10*3/mm3 Final     nRBC   Date Value Ref Range Status   07/03/2023 0.0 0.0 - 0.2 /100 WBC Final       Lab Results   Component Value Date    GLUCOSE 93 07/03/2023    BUN 16 07/03/2023    CREATININE 1.22 07/03/2023    EGFRIFNONA 82 01/22/2020    BCR 13.1 07/03/2023    K 3.7 07/03/2023    CO2 28.3 07/03/2023    CALCIUM 9.8 07/03/2023    ALBUMIN 4.5 07/03/2023    LABIL2 1.5 04/17/2019    AST 22 07/03/2023    ALT 19 07/03/2023         Assessment & Plan     Polycythemia  - CBC & Differential        ASSESSMENT:          Polycythemia rule out myeloproliferative disease.  Discussed the differential diagnosis with patient primary versus secondary.  Suspect this may be secondary secondary to testosterone administration.  Work-up was essentially negative for primary myeloproliferative disease patient has also responded to phlebotomies.  His hemoglobin today is 18 g per DL.  Fatty liver: Referred to GI.  Patient will follow-up with GI  Testosterone administration likely contributing to the above.  Patient has already cut back through the advice of his prescribing physician          Plans       Needs phlebotomies q month. Patient wants to go to the [facility]  Follow-up in 3 months  Patient encouraged to continue baby aspirin 81 mg daily  Call earlier as needed for problems  Patient will follow-up with his physician for adjustment of his testosterone medication  phlebotomize 250 cc of whole blood q weekly for 3 weeks for hemoglobin of more than 17,   Schedule ultrasound of the abdomen to evaluate for spleen size.  Reviewed he has normal spleen size  Follow-up with GI for fatty liver  All questions answered     Patient verbalized understanding and is in agreement of the above plan.

## 2023-12-22 ENCOUNTER — LAB (OUTPATIENT)
Dept: LAB | Facility: HOSPITAL | Age: 43
End: 2023-12-22
Payer: OTHER GOVERNMENT

## 2023-12-22 ENCOUNTER — OFFICE VISIT (OUTPATIENT)
Dept: ONCOLOGY | Facility: CLINIC | Age: 43
End: 2023-12-22
Payer: OTHER GOVERNMENT

## 2023-12-22 VITALS
TEMPERATURE: 98 F | WEIGHT: 299 LBS | DIASTOLIC BLOOD PRESSURE: 84 MMHG | OXYGEN SATURATION: 95 % | HEIGHT: 72 IN | HEART RATE: 77 BPM | RESPIRATION RATE: 18 BRPM | BODY MASS INDEX: 40.5 KG/M2 | SYSTOLIC BLOOD PRESSURE: 142 MMHG

## 2023-12-22 DIAGNOSIS — D75.1 POLYCYTHEMIA: Primary | ICD-10-CM

## 2023-12-22 LAB
BASOPHILS # BLD AUTO: 0.03 10*3/MM3 (ref 0–0.2)
BASOPHILS NFR BLD AUTO: 0.5 % (ref 0–1.5)
DEPRECATED RDW RBC AUTO: 46 FL (ref 37–54)
EOSINOPHIL # BLD AUTO: 0.14 10*3/MM3 (ref 0–0.4)
EOSINOPHIL NFR BLD AUTO: 2.1 % (ref 0.3–6.2)
ERYTHROCYTE [DISTWIDTH] IN BLOOD BY AUTOMATED COUNT: 13.9 % (ref 12.3–15.4)
HCT VFR BLD AUTO: 55.9 % (ref 37.5–51)
HGB BLD-MCNC: 18.6 G/DL (ref 13–17.7)
HOLD SPECIMEN: NORMAL
HOLD SPECIMEN: NORMAL
LYMPHOCYTES # BLD AUTO: 2.06 10*3/MM3 (ref 0.7–3.1)
LYMPHOCYTES NFR BLD AUTO: 31.1 % (ref 19.6–45.3)
MCH RBC QN AUTO: 30.5 PG (ref 26.6–33)
MCHC RBC AUTO-ENTMCNC: 33.3 G/DL (ref 31.5–35.7)
MCV RBC AUTO: 91.6 FL (ref 79–97)
MONOCYTES # BLD AUTO: 0.68 10*3/MM3 (ref 0.1–0.9)
MONOCYTES NFR BLD AUTO: 10.3 % (ref 5–12)
NEUTROPHILS NFR BLD AUTO: 3.72 10*3/MM3 (ref 1.7–7)
NEUTROPHILS NFR BLD AUTO: 56 % (ref 42.7–76)
PLATELET # BLD AUTO: 236 10*3/MM3 (ref 140–450)
PMV BLD AUTO: 9.5 FL (ref 6–12)
RBC # BLD AUTO: 6.1 10*6/MM3 (ref 4.14–5.8)
WBC NRBC COR # BLD AUTO: 6.63 10*3/MM3 (ref 3.4–10.8)

## 2023-12-22 PROCEDURE — 36415 COLL VENOUS BLD VENIPUNCTURE: CPT

## 2023-12-22 PROCEDURE — 85025 COMPLETE CBC W/AUTO DIFF WBC: CPT

## 2024-01-16 ENCOUNTER — OFFICE VISIT (OUTPATIENT)
Dept: FAMILY MEDICINE CLINIC | Facility: CLINIC | Age: 44
End: 2024-01-16
Payer: OTHER GOVERNMENT

## 2024-01-16 VITALS
WEIGHT: 297.8 LBS | BODY MASS INDEX: 40.34 KG/M2 | DIASTOLIC BLOOD PRESSURE: 89 MMHG | HEIGHT: 72 IN | RESPIRATION RATE: 16 BRPM | OXYGEN SATURATION: 95 % | TEMPERATURE: 97.8 F | SYSTOLIC BLOOD PRESSURE: 152 MMHG | HEART RATE: 90 BPM

## 2024-01-16 DIAGNOSIS — J02.9 PHARYNGITIS, UNSPECIFIED ETIOLOGY: Primary | ICD-10-CM

## 2024-01-16 LAB
EXPIRATION DATE: NORMAL
FLUAV AG UPPER RESP QL IA.RAPID: NOT DETECTED
FLUBV AG UPPER RESP QL IA.RAPID: NOT DETECTED
INTERNAL CONTROL: NORMAL
Lab: NORMAL
SARS-COV-2 AG UPPER RESP QL IA.RAPID: NOT DETECTED

## 2024-01-16 PROCEDURE — 99213 OFFICE O/P EST LOW 20 MIN: CPT | Performed by: FAMILY MEDICINE

## 2024-01-16 PROCEDURE — 87428 SARSCOV & INF VIR A&B AG IA: CPT | Performed by: FAMILY MEDICINE

## 2024-01-16 RX ORDER — AMOXICILLIN 875 MG/1
875 TABLET, COATED ORAL 2 TIMES DAILY
Qty: 20 TABLET | Refills: 0 | Status: SHIPPED | OUTPATIENT
Start: 2024-01-16

## 2024-01-16 NOTE — PROGRESS NOTES
Subjective   Chief Complaint   Patient presents with    Sore Throat    Nasal Congestion     Spit up some blood this am/ Since Thursday pm     Anders Garcia is a 43 y.o. male.     Patient Care Team:  Alondra Goodman MD as PCP - General  Alondra Goodman MD as PCP - Family Medicine  Ivy Millan MD as Consulting Physician (Hematology and Oncology)    History of Present Illness  He is coming in today due to upper respiratory symptoms which she has been experiencing over the last 4 days or so.  He reports having some congestion and a sore throat.  He took few COVID-19 tests as he works at the local [employer] and these were negative.  No chest pains or difficulty breathing are being reported.  Today in the morning he coughed up little bit of fresh blood.  His daughter has similar symptoms.  His daughter just tested positive for strep.       The following portions of the patient's history were reviewed and updated as appropriate: allergies, current medications, past family history, past medical history, past social history, past surgical history, and problem list.  Past Medical History:   Diagnosis Date    Back pain     Hyperlipidemia     Hypertension     Obesity     RMSF ([place] spotted fever) 2008    Vitamin D deficiency      Past Surgical History:   Procedure Laterality Date    KNEE SURGERY Left 2018    2 times    TONSILLECTOMY       The patient has a family history of  Family History   Problem Relation Age of Onset    Hypertension Mother      Social History     Socioeconomic History    Marital status:    Tobacco Use    Smoking status: Never    Smokeless tobacco: Current     Types: Chew   Substance and Sexual Activity    Alcohol use: Not Currently    Drug use: No    Sexual activity: Yes     Partners: Male     Birth control/protection: None       Review of Systems   Constitutional:  Negative for activity change, appetite change, chills and fever.   HENT:  Positive for congestion  "and sore throat. Negative for ear pain, postnasal drip, sinus pressure and swollen glands.    Respiratory:  Negative for cough, choking, chest tightness, shortness of breath, wheezing and stridor.    Cardiovascular:  Negative for chest pain.   Skin:  Negative for dry skin and rash.     Visit Vitals  /89 (BP Location: Left arm, Patient Position: Sitting, Cuff Size: Adult)   Pulse 90   Temp 97.8 °F (36.6 °C) (Infrared)   Resp 16   Ht 182.9 cm (72\")   Wt 135 kg (297 lb 12.8 oz)   SpO2 95%   BMI 40.39 kg/m²              Current Outpatient Medications:     ALBUTEROL SULFATE  (90 Base) MCG/ACT inhaler, INHALE 2 PUFFS BY MOUTH EVERY 6 HOURS AS NEEDED FOR WHEEZING, Disp: 25.5 g, Rfl: 0    cyclobenzaprine (FLEXERIL) 10 MG tablet, Take 1 tablet by mouth Every 8 (Eight) Hours As Needed., Disp: , Rfl:     dicyclomine (Bentyl) 10 MG capsule, Take 1 capsule by mouth 2 (Two) Times a Day., Disp: 180 capsule, Rfl: 1    losartan-hydrochlorothiazide (HYZAAR) 100-25 MG per tablet, Take 1 tablet by mouth Daily., Disp: 90 tablet, Rfl: 3    amoxicillin (AMOXIL) 875 MG tablet, Take 1 tablet by mouth 2 (Two) Times a Day., Disp: 20 tablet, Rfl: 0    Objective   Physical Exam  Vitals and nursing note reviewed.   Constitutional:       General: He is not in acute distress.     Appearance: He is well-developed.   HENT:      Head: Normocephalic and atraumatic.      Right Ear: Tympanic membrane and external ear normal.      Left Ear: Tympanic membrane and external ear normal.      Mouth/Throat:      Pharynx: Posterior oropharyngeal erythema present. No oropharyngeal exudate.   Eyes:      Conjunctiva/sclera: Conjunctivae normal.      Pupils: Pupils are equal, round, and reactive to light.   Cardiovascular:      Rate and Rhythm: Normal rate and regular rhythm.      Heart sounds: Normal heart sounds.   Pulmonary:      Effort: Pulmonary effort is normal. No respiratory distress.      Breath sounds: Normal breath sounds. No wheezing or " rales.   Musculoskeletal:      Cervical back: Normal range of motion and neck supple.   Lymphadenopathy:      Cervical: Cervical adenopathy present.   Skin:     General: Skin is warm and dry.      Findings: No rash.       Assessment & Plan   Diagnoses and all orders for this visit:    1. Pharyngitis, unspecified etiology (Primary)  -     POCT SARS-CoV-2 + Flu Antigen EMMANUEL  -     amoxicillin (AMOXIL) 875 MG tablet; Take 1 tablet by mouth 2 (Two) Times a Day.  Dispense: 20 tablet; Refill: 0        His rapid COVID-19 and flu test today were negative, his daughter tested positive for strep today.  I will be starting him on amoxicillin.  He may also continue over-the-counter medication for symptom control and contact us back if any concerns.    Return if symptoms worsen or fail to improve, for Recheck.    Requested Prescriptions     Signed Prescriptions Disp Refills    amoxicillin (AMOXIL) 875 MG tablet 20 tablet 0     Sig: Take 1 tablet by mouth 2 (Two) Times a Day.

## 2024-03-28 ENCOUNTER — LAB (OUTPATIENT)
Dept: LAB | Facility: HOSPITAL | Age: 44
End: 2024-03-28
Payer: OTHER GOVERNMENT

## 2024-03-28 ENCOUNTER — OFFICE VISIT (OUTPATIENT)
Dept: ONCOLOGY | Facility: CLINIC | Age: 44
End: 2024-03-28
Payer: OTHER GOVERNMENT

## 2024-03-28 VITALS
SYSTOLIC BLOOD PRESSURE: 135 MMHG | HEART RATE: 68 BPM | HEIGHT: 72 IN | BODY MASS INDEX: 37.52 KG/M2 | OXYGEN SATURATION: 95 % | DIASTOLIC BLOOD PRESSURE: 88 MMHG | WEIGHT: 277 LBS | RESPIRATION RATE: 18 BRPM | TEMPERATURE: 98 F

## 2024-03-28 DIAGNOSIS — D75.1 POLYCYTHEMIA: Primary | ICD-10-CM

## 2024-03-28 LAB
BASOPHILS # BLD AUTO: 0.01 10*3/MM3 (ref 0–0.2)
BASOPHILS NFR BLD AUTO: 0.1 % (ref 0–1.5)
DEPRECATED RDW RBC AUTO: 45.4 FL (ref 37–54)
EOSINOPHIL # BLD AUTO: 0.12 10*3/MM3 (ref 0–0.4)
EOSINOPHIL NFR BLD AUTO: 1.7 % (ref 0.3–6.2)
ERYTHROCYTE [DISTWIDTH] IN BLOOD BY AUTOMATED COUNT: 13.9 % (ref 12.3–15.4)
HCT VFR BLD AUTO: 55.5 % (ref 37.5–51)
HGB BLD-MCNC: 18.6 G/DL (ref 13–17.7)
HOLD SPECIMEN: NORMAL
HOLD SPECIMEN: NORMAL
LYMPHOCYTES # BLD AUTO: 1.9 10*3/MM3 (ref 0.7–3.1)
LYMPHOCYTES NFR BLD AUTO: 26.3 % (ref 19.6–45.3)
MCH RBC QN AUTO: 30.5 PG (ref 26.6–33)
MCHC RBC AUTO-ENTMCNC: 33.5 G/DL (ref 31.5–35.7)
MCV RBC AUTO: 91 FL (ref 79–97)
MONOCYTES # BLD AUTO: 0.87 10*3/MM3 (ref 0.1–0.9)
MONOCYTES NFR BLD AUTO: 12 % (ref 5–12)
NEUTROPHILS NFR BLD AUTO: 4.33 10*3/MM3 (ref 1.7–7)
NEUTROPHILS NFR BLD AUTO: 59.9 % (ref 42.7–76)
PLATELET # BLD AUTO: 256 10*3/MM3 (ref 140–450)
PMV BLD AUTO: 9.4 FL (ref 6–12)
RBC # BLD AUTO: 6.1 10*6/MM3 (ref 4.14–5.8)
WBC NRBC COR # BLD AUTO: 7.23 10*3/MM3 (ref 3.4–10.8)

## 2024-03-28 PROCEDURE — 85025 COMPLETE CBC W/AUTO DIFF WBC: CPT

## 2024-03-28 PROCEDURE — 36415 COLL VENOUS BLD VENIPUNCTURE: CPT

## 2024-03-28 NOTE — PROGRESS NOTES
Hematology/Oncology Outpatient Follow Up    PATIENT NAME:Anders Garcia  :1980  MRN: [id number]  PRIMARY CARE PHYSICIAN: Alondra Goodman MD  REFERRING PHYSICIAN: No ref. provider found    Chief Complaint   Patient presents with    Follow-up     Polycythemia        HISTORY OF PRESENT ILLNESS:       This is a 43-year-old male has referred secondary to polycythemia.  Patient has been noted to have polycythemia since 2022.  At that time white count was 5.9, hemoglobin 19.4, MCV was normal at 91 and platelets were 274.  7/3/2023 white count was 7.0 hemoglobin is 19 platelets are 269 with essentially unremarkable differentials.  Today 2023 white count is 8.5, hemoglobin is 20.2 and platelets are 181 with unremarkable differentials.     Patient has been referred due to polycythemia.  He had a chemistry panel about a month ago which was essentially unremarkable with a BUN of 16, creatinine of 1.22.     Patient has been on testosterone 2 times a week since . Patient is on 0.5mls twice weekly.  Otherwise he has no prior history of any hematologic problems.  Has never received blood transfusions in the past.        Patient does not smoke. 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  Erythropoietin level was normal at 10.7, BCR ABL was negative, LDH was normal at 156, uric acid was normal at 6.3, JAK2 analysis was negative, B12 was normal at 567  Past Medical History:   Diagnosis Date    Back pain     Hyperlipidemia     Hypertension     Obesity     RMSF ([place] spotted fever)     Vitamin D deficiency        Past Surgical History:   Procedure Laterality Date    KNEE SURGERY Left 2018    2 times    TONSILLECTOMY           Current Outpatient Medications:     ALBUTEROL SULFATE  (90 Base) MCG/ACT inhaler, INHALE 2 PUFFS BY MOUTH EVERY 6 HOURS AS NEEDED FOR WHEEZING,  Disp: 25.5 g, Rfl: 0    amoxicillin (AMOXIL) 875 MG tablet, Take 1 tablet by mouth 2 (Two) Times a Day., Disp: 20 tablet, Rfl: 0    cyclobenzaprine (FLEXERIL) 10 MG tablet, Take 1 tablet by mouth Every 8 (Eight) Hours As Needed., Disp: , Rfl:     dicyclomine (Bentyl) 10 MG capsule, Take 1 capsule by mouth 2 (Two) Times a Day., Disp: 180 capsule, Rfl: 1    losartan-hydrochlorothiazide (HYZAAR) 100-25 MG per tablet, Take 1 tablet by mouth Daily., Disp: 90 tablet, Rfl: 3    Allergies   Allergen Reactions    Tramadol Itching       Family History   Problem Relation Age of Onset    Hypertension Mother        Cancer-related family history is not on file.    Social History     Tobacco Use    Smoking status: Never    Smokeless tobacco: Current     Types: Chew   Substance Use Topics    Alcohol use: Not Currently    Drug use: No       I have reviewed and confirmed the accuracy of the patient's history: Chief complaint, HPI, ROS, and Subjective as entered by the MA/LPN/RN. Ivyjaylen Millan MD 03/28/24          SUBJECTIVE:    Patient is here today for routine follow-up and does not have any new issues.  He remains on testosterone injections.    Patient has been noncompliant with phlebotomies through the [facility] due to work schedule conflict           REVIEW OF SYSTEMS:    Review of Systems   Constitutional:  Negative for chills, fatigue and fever.   HENT:  Negative for congestion, drooling, ear discharge, rhinorrhea, sinus pressure and tinnitus.    Eyes:  Negative for photophobia, pain and discharge.   Respiratory:  Negative for apnea, choking and stridor.    Cardiovascular:  Negative for palpitations.   Gastrointestinal:  Negative for abdominal distention, abdominal pain and anal bleeding.   Endocrine: Negative for polydipsia and polyphagia.   Genitourinary:  Negative for decreased urine volume, flank pain and genital sores.   Musculoskeletal:  Negative for gait problem, neck pain and neck stiffness.   Skin:  Negative  "for color change, rash and wound.   Neurological:  Negative for tremors, seizures, syncope, facial asymmetry and speech difficulty.   Hematological:  Negative for adenopathy.   Psychiatric/Behavioral:  Negative for agitation, confusion, hallucinations and self-injury. The patient is not hyperactive.        OBJECTIVE:    Vitals:    03/28/24 1014   BP: 135/88   Pulse: 68   Resp: 18   Temp: 98 °F (36.7 °C)   TempSrc: Infrared   SpO2: 95%   Weight: 126 kg (277 lb)   Height: 182.9 cm (72\")   PainSc: 0-No pain         Body mass index is 37.57 kg/m².      ECOG    (0) Fully active, able to carry on all predisease performance without restriction    Physical Exam  Vitals and nursing note reviewed.   Constitutional:       General: He is not in acute distress.     Appearance: He is not diaphoretic.   HENT:      Head: Normocephalic and atraumatic.   Eyes:      General: No scleral icterus.        Right eye: No discharge.         Left eye: No discharge.      Conjunctiva/sclera: Conjunctivae normal.   Neck:      Thyroid: No thyromegaly.   Cardiovascular:      Rate and Rhythm: Normal rate and regular rhythm.      Heart sounds: Normal heart sounds.      No friction rub. No gallop.   Pulmonary:      Effort: Pulmonary effort is normal. No respiratory distress.      Breath sounds: No stridor. No wheezing.   Abdominal:      General: Bowel sounds are normal.      Palpations: Abdomen is soft. There is no mass.      Tenderness: There is no abdominal tenderness. There is no guarding or rebound.   Musculoskeletal:         General: No tenderness. Normal range of motion.      Cervical back: Normal range of motion and neck supple.   Lymphadenopathy:      Cervical: No cervical adenopathy.   Skin:     General: Skin is warm.      Findings: No erythema or rash.   Neurological:      Mental Status: He is alert and oriented to person, place, and time.      Motor: No abnormal muscle tone.   Psychiatric:         Behavior: Behavior normal.       I have " reexamined the patient and the results are consistent with the previously documented exam. Ivy Millan MD          RECENT LABS    WBC   Date Value Ref Range Status   03/28/2024 7.23 3.40 - 10.80 10*3/mm3 Final   06/02/2022 7.3 3.4 - 10.8 x10E3/uL Final     RBC   Date Value Ref Range Status   03/28/2024 6.10 (H) 4.14 - 5.80 10*6/mm3 Final   06/02/2022 6.11 (H) 4.14 - 5.80 x10E6/uL Final     Hemoglobin   Date Value Ref Range Status   03/28/2024 18.6 (H) 13.0 - 17.7 g/dL Final     Hematocrit   Date Value Ref Range Status   03/28/2024 55.5 (H) 37.5 - 51.0 % Final     MCV   Date Value Ref Range Status   03/28/2024 91.0 79.0 - 97.0 fL Final     MCH   Date Value Ref Range Status   03/28/2024 30.5 26.6 - 33.0 pg Final     MCHC   Date Value Ref Range Status   03/28/2024 33.5 31.5 - 35.7 g/dL Final     RDW   Date Value Ref Range Status   03/28/2024 13.9 12.3 - 15.4 % Final     RDW-SD   Date Value Ref Range Status   03/28/2024 45.4 37.0 - 54.0 fl Final     MPV   Date Value Ref Range Status   03/28/2024 9.4 6.0 - 12.0 fL Final     Platelets   Date Value Ref Range Status   03/28/2024 256 140 - 450 10*3/mm3 Final     Neutrophil %   Date Value Ref Range Status   03/28/2024 59.9 42.7 - 76.0 % Final     Lymphocyte %   Date Value Ref Range Status   03/28/2024 26.3 19.6 - 45.3 % Final     Monocyte %   Date Value Ref Range Status   03/28/2024 12.0 5.0 - 12.0 % Final     Eosinophil %   Date Value Ref Range Status   03/28/2024 1.7 0.3 - 6.2 % Final     Basophil %   Date Value Ref Range Status   03/28/2024 0.1 0.0 - 1.5 % Final     Immature Grans %   Date Value Ref Range Status   07/03/2023 0.4 0.0 - 0.5 % Final     Neutrophils, Absolute   Date Value Ref Range Status   03/28/2024 4.33 1.70 - 7.00 10*3/mm3 Final     Lymphocytes, Absolute   Date Value Ref Range Status   03/28/2024 1.90 0.70 - 3.10 10*3/mm3 Final     Monocytes, Absolute   Date Value Ref Range Status   03/28/2024 0.87 0.10 - 0.90 10*3/mm3 Final     Eosinophils,  Absolute   Date Value Ref Range Status   03/28/2024 0.12 0.00 - 0.40 10*3/mm3 Final     Basophils, Absolute   Date Value Ref Range Status   03/28/2024 0.01 0.00 - 0.20 10*3/mm3 Final     Immature Grans, Absolute   Date Value Ref Range Status   07/03/2023 0.03 0.00 - 0.05 10*3/mm3 Final     nRBC   Date Value Ref Range Status   07/03/2023 0.0 0.0 - 0.2 /100 WBC Final       Lab Results   Component Value Date    GLUCOSE 93 07/03/2023    BUN 16 07/03/2023    CREATININE 1.22 07/03/2023    EGFRIFNONA 82 01/22/2020    BCR 13.1 07/03/2023    K 3.7 07/03/2023    CO2 28.3 07/03/2023    CALCIUM 9.8 07/03/2023    ALBUMIN 4.5 07/03/2023    LABIL2 1.5 04/17/2019    AST 22 07/03/2023    ALT 19 07/03/2023         Assessment & Plan     Polycythemia  - CBC & Differential          ASSESSMENT:          Polycythemia rule out myeloproliferative disease.  Discussed the differential diagnosis with patient primary versus secondary.  Suspect this may be secondary secondary to testosterone administration.  Work-up was essentially negative for primary myeloproliferative disease patient has also responded to phlebotomies.  His hemoglobin remains at 18 g per DL.  Continue phlebotomies  Fatty liver: Referred to GI.  Patient will follow-up with GI.  Reviewed  Testosterone administration likely contributing to the above.  Patient has already cut back through the advice of his prescribing physician          Plans       Needs phlebotomies q month. Patient wants to go to the [facility].  Patient to be compliant.  Will try to schedule at the ambulatory care services  Follow-up 4 months  Continue baby aspirin  Call earlier as needed for problems  Patient will follow-up with his physician for adjustment of his testosterone medication  phlebotomize 250 cc of whole blood q weekly for 3 weeks for hemoglobin of more than 17,   Schedule ultrasound of the abdomen to evaluate for spleen size.  Reviewed he has normal spleen size  Follow-up with GI for fatty  liver  All questions answered     Patient verbalized understanding and is in agreement of the above plan.

## 2024-03-29 ENCOUNTER — TELEPHONE (OUTPATIENT)
Dept: ONCOLOGY | Facility: CLINIC | Age: 44
End: 2024-03-29
Payer: OTHER GOVERNMENT

## 2024-03-29 NOTE — TELEPHONE ENCOUNTER
Message sent to Caroline at [facility] at the request of Dr. Millan to see if the pt could have his monthly therapeutic phlebotomies done with them on Saturdays due to his work schedule. Response received from unit manager, Gianna, asking if the pt qualified for FMLA to be able to have it done during the week. Attempted to call pt to let him know that he would have to exhaust other options before being able to have phlebotomies done at [facility]. No answer or identifying VM. Callback number left.    Pt returned my call and stated that he would be able to complete the phlebotomies during the week. I advised him to call back if anything changes.

## 2024-06-22 RX ORDER — LOSARTAN POTASSIUM AND HYDROCHLOROTHIAZIDE 25; 100 MG/1; MG/1
1 TABLET ORAL DAILY
Qty: 90 TABLET | Refills: 0 | Status: SHIPPED | OUTPATIENT
Start: 2024-06-22

## 2024-07-01 ENCOUNTER — TELEPHONE (OUTPATIENT)
Dept: ONCOLOGY | Facility: CLINIC | Age: 44
End: 2024-07-01

## 2024-07-01 NOTE — TELEPHONE ENCOUNTER
"  Caller: Anders Garcia    Relationship: Self    Best call back number: [phone]    Who are you requesting to speak with (clinical staff, provider,  specific staff member): scheduling    Do you know the name of the person who called: patient    What was the call regarding: pt advises that he sent a message through \"mychart\" advising that he wasn't feeling well, and would like to schedule a phlebotomy appt   "

## 2024-07-02 ENCOUNTER — LAB (OUTPATIENT)
Dept: LAB | Facility: HOSPITAL | Age: 44
End: 2024-07-02
Payer: OTHER GOVERNMENT

## 2024-07-02 ENCOUNTER — HOSPITAL ENCOUNTER (OUTPATIENT)
Dept: ONCOLOGY | Facility: HOSPITAL | Age: 44
Discharge: HOME | End: 2024-07-02
Payer: OTHER GOVERNMENT

## 2024-07-02 VITALS
SYSTOLIC BLOOD PRESSURE: 144 MMHG | OXYGEN SATURATION: 95 % | RESPIRATION RATE: 16 BRPM | BODY MASS INDEX: 35.65 KG/M2 | WEIGHT: 263.2 LBS | DIASTOLIC BLOOD PRESSURE: 90 MMHG | TEMPERATURE: 99.2 F | HEART RATE: 71 BPM | HEIGHT: 72 IN

## 2024-07-02 DIAGNOSIS — D75.1 POLYCYTHEMIA: ICD-10-CM

## 2024-07-02 DIAGNOSIS — D75.1 POLYCYTHEMIA: Primary | ICD-10-CM

## 2024-07-02 LAB
BASOPHILS # BLD AUTO: 0.03 10*3/MM3 (ref 0–0.2)
BASOPHILS NFR BLD AUTO: 0.3 % (ref 0–1.5)
DEPRECATED RDW RBC AUTO: 46.5 FL (ref 37–54)
EOSINOPHIL # BLD AUTO: 0.13 10*3/MM3 (ref 0–0.4)
EOSINOPHIL NFR BLD AUTO: 1.4 % (ref 0.3–6.2)
ERYTHROCYTE [DISTWIDTH] IN BLOOD BY AUTOMATED COUNT: 14.6 % (ref 12.3–15.4)
FERRITIN SERPL-MCNC: 43.6 NG/ML (ref 30–400)
HCT VFR BLD AUTO: 55.1 % (ref 37.5–51)
HCT VFR BLD AUTO: 56.1 % (ref 37.5–51)
HGB BLD-MCNC: 18.4 G/DL (ref 13–17.7)
LYMPHOCYTES # BLD AUTO: 2.16 10*3/MM3 (ref 0.7–3.1)
LYMPHOCYTES NFR BLD AUTO: 22.6 % (ref 19.6–45.3)
MCH RBC QN AUTO: 30.1 PG (ref 26.6–33)
MCHC RBC AUTO-ENTMCNC: 33.4 G/DL (ref 31.5–35.7)
MCV RBC AUTO: 90.2 FL (ref 79–97)
MONOCYTES # BLD AUTO: 1.05 10*3/MM3 (ref 0.1–0.9)
MONOCYTES NFR BLD AUTO: 11 % (ref 5–12)
NEUTROPHILS NFR BLD AUTO: 6.18 10*3/MM3 (ref 1.7–7)
NEUTROPHILS NFR BLD AUTO: 64.7 % (ref 42.7–76)
PLATELET # BLD AUTO: 276 10*3/MM3 (ref 140–450)
PMV BLD AUTO: 9.2 FL (ref 6–12)
RBC # BLD AUTO: 6.11 10*6/MM3 (ref 4.14–5.8)
WBC NRBC COR # BLD AUTO: 9.55 10*3/MM3 (ref 3.4–10.8)

## 2024-07-02 PROCEDURE — 85014 HEMATOCRIT: CPT

## 2024-07-02 PROCEDURE — 82728 ASSAY OF FERRITIN: CPT | Performed by: INTERNAL MEDICINE

## 2024-07-02 PROCEDURE — 36415 COLL VENOUS BLD VENIPUNCTURE: CPT

## 2024-07-02 PROCEDURE — 99195 PHLEBOTOMY: CPT

## 2024-07-02 PROCEDURE — 85025 COMPLETE CBC W/AUTO DIFF WBC: CPT

## 2024-07-02 NOTE — ADDENDUM NOTE
Encounter addended by: Aubree Alberts RN on: 7/2/2024 4:38 PM   Actions taken: Treatment plan modified

## 2024-07-02 NOTE — PROGRESS NOTES
Pt to the clinic for phlebotomy.  He reports that he called into the office yesterday reporting he was not feeling well. He was then scheduled for phlebotomy today. He report to generally not feeling well and believed his hgb is too high. CBC today showed the hgb is 18.4. Phlebotomy performed.  He questioned to removal amount of only 250cc, which I confirmed with Dr. Millan. He had been donating blood to keep the hgb low but was unable to at the beginning of June due to hgb being above 20.  Discussed with Dr. Millan, order given to increase phlebotomy amount to 500ml and recheck next week. Patient tolerated procedure well.  I scheduled the patient to return next Thursday for a recheck and phlebotomy.

## 2024-07-11 ENCOUNTER — HOSPITAL ENCOUNTER (OUTPATIENT)
Dept: ONCOLOGY | Facility: HOSPITAL | Age: 44
Discharge: HOME | End: 2024-07-11
Admitting: INTERNAL MEDICINE
Payer: OTHER GOVERNMENT

## 2024-07-11 VITALS
HEIGHT: 72 IN | BODY MASS INDEX: 36.16 KG/M2 | DIASTOLIC BLOOD PRESSURE: 93 MMHG | OXYGEN SATURATION: 94 % | RESPIRATION RATE: 16 BRPM | WEIGHT: 267 LBS | SYSTOLIC BLOOD PRESSURE: 138 MMHG | HEART RATE: 88 BPM | TEMPERATURE: 98.2 F

## 2024-07-11 DIAGNOSIS — D75.1 POLYCYTHEMIA: Primary | ICD-10-CM

## 2024-07-11 LAB
BASOPHILS # BLD AUTO: 0.02 10*3/MM3 (ref 0–0.2)
BASOPHILS NFR BLD AUTO: 0.2 % (ref 0–1.5)
DEPRECATED RDW RBC AUTO: 46.7 FL (ref 37–54)
EOSINOPHIL # BLD AUTO: 0.22 10*3/MM3 (ref 0–0.4)
EOSINOPHIL NFR BLD AUTO: 2.3 % (ref 0.3–6.2)
ERYTHROCYTE [DISTWIDTH] IN BLOOD BY AUTOMATED COUNT: 14.4 % (ref 12.3–15.4)
FERRITIN SERPL-MCNC: 33.5 NG/ML (ref 30–400)
HCT VFR BLD AUTO: 53.3 % (ref 37.5–51)
HGB BLD-MCNC: 17.7 G/DL (ref 13–17.7)
LYMPHOCYTES # BLD AUTO: 2.14 10*3/MM3 (ref 0.7–3.1)
LYMPHOCYTES NFR BLD AUTO: 22.1 % (ref 19.6–45.3)
MCH RBC QN AUTO: 30 PG (ref 26.6–33)
MCHC RBC AUTO-ENTMCNC: 33.2 G/DL (ref 31.5–35.7)
MCV RBC AUTO: 90.3 FL (ref 79–97)
MONOCYTES # BLD AUTO: 0.89 10*3/MM3 (ref 0.1–0.9)
MONOCYTES NFR BLD AUTO: 9.2 % (ref 5–12)
NEUTROPHILS NFR BLD AUTO: 6.41 10*3/MM3 (ref 1.7–7)
NEUTROPHILS NFR BLD AUTO: 66.2 % (ref 42.7–76)
PLATELET # BLD AUTO: 234 10*3/MM3 (ref 140–450)
PMV BLD AUTO: 9.2 FL (ref 6–12)
RBC # BLD AUTO: 5.9 10*6/MM3 (ref 4.14–5.8)
WBC NRBC COR # BLD AUTO: 9.68 10*3/MM3 (ref 3.4–10.8)

## 2024-07-11 PROCEDURE — 85025 COMPLETE CBC W/AUTO DIFF WBC: CPT | Performed by: INTERNAL MEDICINE

## 2024-07-11 PROCEDURE — 82728 ASSAY OF FERRITIN: CPT | Performed by: INTERNAL MEDICINE

## 2024-07-11 PROCEDURE — 99195 PHLEBOTOMY: CPT

## 2024-07-11 NOTE — PROGRESS NOTES
Pt here for a therapeutic phlebotomy. IV started with blood return noted. Blood volume removed per tx plan. Pt tolerated procedure without any complications. Vitals WNL and pt denies any dizziness or other symptoms. Pt declined post IVF today. IV removed and pt given AVS and d/c by self.

## 2024-07-31 NOTE — PROGRESS NOTES
Hematology/Oncology Outpatient Follow Up    PATIENT NAME:Anders Garcia  :1980  MRN: [id number]  PRIMARY CARE PHYSICIAN: Alondra Goodman MD  REFERRING PHYSICIAN: No ref. provider found    Chief Complaint   Patient presents with    Follow-up     Polycythemia        HISTORY OF PRESENT ILLNESS:       This is a 43-year-old male has referred secondary to polycythemia.  Patient has been noted to have polycythemia since 2022.  At that time white count was 5.9, hemoglobin 19.4, MCV was normal at 91 and platelets were 274.  7/3/2023 white count was 7.0 hemoglobin is 19 platelets are 269 with essentially unremarkable differentials.  Today 2023 white count is 8.5, hemoglobin is 20.2 and platelets are 181 with unremarkable differentials.     Patient has been referred due to polycythemia.  He had a chemistry panel about a month ago which was essentially unremarkable with a BUN of 16, creatinine of 1.22.     Patient has been on testosterone 2 times a week since . Patient is on 0.5mls twice weekly.  Otherwise he has no prior history of any hematologic problems.  Has never received blood transfusions in the past.        Patient does not smoke. He does not drink     Patient works in [employer]  [employer]     Family history of cancer both grnadparents    2023: Patient had work-up for polycythemia.  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)     Vitamin D deficiency        Past Surgical History:   Procedure Laterality Date    KNEE SURGERY Left 2018    2 times    TONSILLECTOMY           Current Outpatient Medications:     aspirin 81 MG chewable tablet, Chew 1 tablet Daily., Disp: , Rfl:     ALBUTEROL SULFATE  (90 Base) MCG/ACT  inhaler, INHALE 2 PUFFS BY MOUTH EVERY 6 HOURS AS NEEDED FOR WHEEZING, Disp: 25.5 g, Rfl: 0    amoxicillin (AMOXIL) 875 MG tablet, Take 1 tablet by mouth 2 (Two) Times a Day. (Patient not taking: Reported on 8/1/2024), Disp: 20 tablet, Rfl: 0    dicyclomine (Bentyl) 10 MG capsule, Take 1 capsule by mouth 2 (Two) Times a Day., Disp: 180 capsule, Rfl: 1    losartan-hydrochlorothiazide (HYZAAR) 100-25 MG per tablet, TAKE 1 TABLET DAILY, Disp: 90 tablet, Rfl: 0    Testosterone Cypionate 200 MG/ML solution, Inject  as directed., Disp: , Rfl:     Allergies   Allergen Reactions    Tramadol Itching       Family History   Problem Relation Age of Onset    Hypertension Mother        Cancer-related family history is not on file.    Social History     Tobacco Use    Smoking status: Never    Smokeless tobacco: Current     Types: Chew   Substance Use Topics    Alcohol use: Not Currently    Drug use: No         I have reviewed and confirmed the accuracy of the patient's history: Chief complaint, HPI, ROS, and Subjective as entered by the MA/LPN/RN. Ivyjaylen Millan MD 08/01/24 8/1/2024      SUBJECTIVE:    Patient is here today for routine follow-up and does not have any new issues.  He remains on testosterone injections.    Patient has been noncompliant with phlebotomies through the [facility] due to work schedule conflict       Denies any new issues.  He is here today for routine follow-up.  Continues to tolerate phlebotomies well.          REVIEW OF SYSTEMS:    Review of Systems   Constitutional:  Negative for chills, fatigue and fever.   HENT:  Negative for congestion, drooling, ear discharge, rhinorrhea, sinus pressure and tinnitus.    Eyes:  Negative for photophobia, pain and discharge.   Respiratory:  Negative for apnea, choking and stridor.    Cardiovascular:  Negative for palpitations.   Gastrointestinal:  Negative for abdominal distention, abdominal pain and anal bleeding.   Endocrine: Negative for polydipsia and  "polyphagia.   Genitourinary:  Negative for decreased urine volume, flank pain and genital sores.   Musculoskeletal:  Negative for gait problem, neck pain and neck stiffness.   Skin:  Negative for color change, rash and wound.   Neurological:  Negative for tremors, seizures, syncope, facial asymmetry and speech difficulty.   Hematological:  Negative for adenopathy.   Psychiatric/Behavioral:  Negative for agitation, confusion, hallucinations and self-injury. The patient is not hyperactive.        OBJECTIVE:    Vitals:    08/01/24 0929   BP: 126/86   Pulse: 83   Resp: 18   Temp: 98 °F (36.7 °C)   TempSrc: Infrared   SpO2: 97%   Weight: 120 kg (265 lb)   Height: 182.9 cm (72\")   PainSc: 0-No pain           Body mass index is 35.94 kg/m².      ECOG    (0) Fully active, able to carry on all predisease performance without restriction    Physical Exam  Vitals and nursing note reviewed.   Constitutional:       General: He is not in acute distress.     Appearance: He is not diaphoretic.   HENT:      Head: Normocephalic and atraumatic.   Eyes:      General: No scleral icterus.        Right eye: No discharge.         Left eye: No discharge.      Conjunctiva/sclera: Conjunctivae normal.   Neck:      Thyroid: No thyromegaly.   Cardiovascular:      Rate and Rhythm: Normal rate and regular rhythm.      Heart sounds: Normal heart sounds.      No friction rub. No gallop.   Pulmonary:      Effort: Pulmonary effort is normal. No respiratory distress.      Breath sounds: No stridor. No wheezing.   Abdominal:      General: Bowel sounds are normal.      Palpations: Abdomen is soft. There is no mass.      Tenderness: There is no abdominal tenderness. There is no guarding or rebound.   Musculoskeletal:         General: No tenderness. Normal range of motion.      Cervical back: Normal range of motion and neck supple.   Lymphadenopathy:      Cervical: No cervical adenopathy.   Skin:     General: Skin is warm.      Findings: No erythema or " rash.   Neurological:      Mental Status: He is alert and oriented to person, place, and time.      Motor: No abnormal muscle tone.   Psychiatric:         Behavior: Behavior normal.       I have reexamined the patient and the results are consistent with the previously documented exam. Ivy Millan MD          RECENT LABS    WBC   Date Value Ref Range Status   08/01/2024 7.65 3.40 - 10.80 10*3/mm3 Final   06/02/2022 7.3 3.4 - 10.8 x10E3/uL Final     RBC   Date Value Ref Range Status   08/01/2024 5.65 4.14 - 5.80 10*6/mm3 Final   06/02/2022 6.11 (H) 4.14 - 5.80 x10E6/uL Final     Hemoglobin   Date Value Ref Range Status   08/01/2024 17.4 13.0 - 17.7 g/dL Final     Hematocrit   Date Value Ref Range Status   08/01/2024 50.5 37.5 - 51.0 % Final     MCV   Date Value Ref Range Status   08/01/2024 89.4 79.0 - 97.0 fL Final     MCH   Date Value Ref Range Status   08/01/2024 30.8 26.6 - 33.0 pg Final     MCHC   Date Value Ref Range Status   08/01/2024 34.5 31.5 - 35.7 g/dL Final     RDW   Date Value Ref Range Status   08/01/2024 13.9 12.3 - 15.4 % Final     RDW-SD   Date Value Ref Range Status   08/01/2024 44.9 37.0 - 54.0 fl Final     MPV   Date Value Ref Range Status   08/01/2024 9.0 6.0 - 12.0 fL Final     Platelets   Date Value Ref Range Status   08/01/2024 261 140 - 450 10*3/mm3 Final     Neutrophil %   Date Value Ref Range Status   08/01/2024 59.3 42.7 - 76.0 % Final     Lymphocyte %   Date Value Ref Range Status   08/01/2024 25.9 19.6 - 45.3 % Final     Monocyte %   Date Value Ref Range Status   08/01/2024 11.9 5.0 - 12.0 % Final     Eosinophil %   Date Value Ref Range Status   08/01/2024 2.6 0.3 - 6.2 % Final     Basophil %   Date Value Ref Range Status   08/01/2024 0.3 0.0 - 1.5 % Final     Immature Grans %   Date Value Ref Range Status   07/03/2023 0.4 0.0 - 0.5 % Final     Neutrophils, Absolute   Date Value Ref Range Status   08/01/2024 4.54 1.70 - 7.00 10*3/mm3 Final     Lymphocytes, Absolute   Date  Value Ref Range Status   08/01/2024 1.98 0.70 - 3.10 10*3/mm3 Final     Monocytes, Absolute   Date Value Ref Range Status   08/01/2024 0.91 (H) 0.10 - 0.90 10*3/mm3 Final     Eosinophils, Absolute   Date Value Ref Range Status   08/01/2024 0.20 0.00 - 0.40 10*3/mm3 Final     Basophils, Absolute   Date Value Ref Range Status   08/01/2024 0.02 0.00 - 0.20 10*3/mm3 Final     Immature Grans, Absolute   Date Value Ref Range Status   07/03/2023 0.03 0.00 - 0.05 10*3/mm3 Final     nRBC   Date Value Ref Range Status   07/03/2023 0.0 0.0 - 0.2 /100 WBC Final       Lab Results   Component Value Date    GLUCOSE 93 07/03/2023    BUN 16 07/03/2023    CREATININE 1.22 07/03/2023    EGFRIFNONA 82 01/22/2020    BCR 13.1 07/03/2023    K 3.7 07/03/2023    CO2 28.3 07/03/2023    CALCIUM 9.8 07/03/2023    ALBUMIN 4.5 07/03/2023    LABIL2 1.5 04/17/2019    AST 22 07/03/2023    ALT 19 07/03/2023         Assessment & Plan     Polycythemia  - CBC & Differential  - CBC & Differential            ASSESSMENT:          Polycythemia rule out myeloproliferative disease.  Discussed the differential diagnosis with patient primary versus secondary.  Suspect this may be secondary secondary to testosterone administration.  Work-up was essentially negative for primary myeloproliferative disease patient has also responded to phlebotomies.  His hemoglobin remains at 18 g per DL.  Continue phlebotomies.  Patient goes to the [facility]  Fatty liver: He was referred to GI   Testosterone administration likely contributing to the above.  Patient has already cut back through the advice of his prescribing physician          Plans       Needs phlebotomies q month. Patient wants to go to the [facility].  Patient to be compliant.  Will try to schedule at the ambulatory care services  CBC every 3 months  Follow-up 6 months  Continue baby aspirin  Call earlier as needed for problems  Patient will follow-up with his physician for adjustment of his testosterone  medication  phlebotomize 250 cc of whole blood q weekly for 3 weeks for hemoglobin of more than 17,   Schedule ultrasound of the abdomen to evaluate for spleen size.  Reviewed he has normal spleen size  Follow-up with GI for fatty liver  All questions answered     Patient verbalized understanding and is in agreement of the above plan.

## 2024-08-01 ENCOUNTER — LAB (OUTPATIENT)
Dept: LAB | Facility: HOSPITAL | Age: 44
End: 2024-08-01
Payer: OTHER GOVERNMENT

## 2024-08-01 ENCOUNTER — OFFICE VISIT (OUTPATIENT)
Dept: ONCOLOGY | Facility: CLINIC | Age: 44
End: 2024-08-01
Payer: OTHER GOVERNMENT

## 2024-08-01 VITALS
RESPIRATION RATE: 18 BRPM | WEIGHT: 265 LBS | SYSTOLIC BLOOD PRESSURE: 126 MMHG | OXYGEN SATURATION: 97 % | DIASTOLIC BLOOD PRESSURE: 86 MMHG | HEIGHT: 72 IN | TEMPERATURE: 98 F | HEART RATE: 83 BPM | BODY MASS INDEX: 35.89 KG/M2

## 2024-08-01 DIAGNOSIS — D75.1 POLYCYTHEMIA: Primary | ICD-10-CM

## 2024-08-01 LAB
BASOPHILS # BLD AUTO: 0.02 10*3/MM3 (ref 0–0.2)
BASOPHILS NFR BLD AUTO: 0.3 % (ref 0–1.5)
DEPRECATED RDW RBC AUTO: 44.9 FL (ref 37–54)
EOSINOPHIL # BLD AUTO: 0.2 10*3/MM3 (ref 0–0.4)
EOSINOPHIL NFR BLD AUTO: 2.6 % (ref 0.3–6.2)
ERYTHROCYTE [DISTWIDTH] IN BLOOD BY AUTOMATED COUNT: 13.9 % (ref 12.3–15.4)
HCT VFR BLD AUTO: 50.5 % (ref 37.5–51)
HGB BLD-MCNC: 17.4 G/DL (ref 13–17.7)
HOLD SPECIMEN: NORMAL
HOLD SPECIMEN: NORMAL
LYMPHOCYTES # BLD AUTO: 1.98 10*3/MM3 (ref 0.7–3.1)
LYMPHOCYTES NFR BLD AUTO: 25.9 % (ref 19.6–45.3)
MCH RBC QN AUTO: 30.8 PG (ref 26.6–33)
MCHC RBC AUTO-ENTMCNC: 34.5 G/DL (ref 31.5–35.7)
MCV RBC AUTO: 89.4 FL (ref 79–97)
MONOCYTES # BLD AUTO: 0.91 10*3/MM3 (ref 0.1–0.9)
MONOCYTES NFR BLD AUTO: 11.9 % (ref 5–12)
NEUTROPHILS NFR BLD AUTO: 4.54 10*3/MM3 (ref 1.7–7)
NEUTROPHILS NFR BLD AUTO: 59.3 % (ref 42.7–76)
PLATELET # BLD AUTO: 261 10*3/MM3 (ref 140–450)
PMV BLD AUTO: 9 FL (ref 6–12)
RBC # BLD AUTO: 5.65 10*6/MM3 (ref 4.14–5.8)
WBC NRBC COR # BLD AUTO: 7.65 10*3/MM3 (ref 3.4–10.8)

## 2024-08-01 PROCEDURE — 85025 COMPLETE CBC W/AUTO DIFF WBC: CPT

## 2024-08-01 PROCEDURE — 36415 COLL VENOUS BLD VENIPUNCTURE: CPT

## 2024-08-01 RX ORDER — TESTOSTERONE CYPIONATE 200 MG/ML
VIAL (ML) INTRAMUSCULAR
COMMUNITY

## 2024-08-01 RX ORDER — ASPIRIN 81 MG/1
81 TABLET, CHEWABLE ORAL DAILY
COMMUNITY

## 2024-09-30 RX ORDER — LOSARTAN POTASSIUM AND HYDROCHLOROTHIAZIDE 25; 100 MG/1; MG/1
1 TABLET ORAL DAILY
Qty: 90 TABLET | Refills: 1 | Status: SHIPPED | OUTPATIENT
Start: 2024-09-30

## 2024-11-01 DIAGNOSIS — D75.1 POLYCYTHEMIA: Primary | ICD-10-CM

## 2024-11-04 ENCOUNTER — HOSPITAL ENCOUNTER (OUTPATIENT)
Dept: ONCOLOGY | Facility: HOSPITAL | Age: 44
Discharge: HOME | End: 2024-11-04
Payer: OTHER GOVERNMENT

## 2024-11-04 ENCOUNTER — LAB (OUTPATIENT)
Dept: LAB | Facility: HOSPITAL | Age: 44
End: 2024-11-04
Payer: OTHER GOVERNMENT

## 2024-11-04 VITALS
SYSTOLIC BLOOD PRESSURE: 143 MMHG | DIASTOLIC BLOOD PRESSURE: 100 MMHG | OXYGEN SATURATION: 94 % | RESPIRATION RATE: 16 BRPM | WEIGHT: 276 LBS | BODY MASS INDEX: 37.43 KG/M2 | HEART RATE: 95 BPM

## 2024-11-04 DIAGNOSIS — D75.1 POLYCYTHEMIA: ICD-10-CM

## 2024-11-04 DIAGNOSIS — D75.1 POLYCYTHEMIA: Primary | ICD-10-CM

## 2024-11-04 LAB
BASOPHILS # BLD AUTO: 0.02 10*3/MM3 (ref 0–0.2)
BASOPHILS NFR BLD AUTO: 0.2 % (ref 0–1.5)
DEPRECATED RDW RBC AUTO: 45.9 FL (ref 37–54)
EOSINOPHIL # BLD AUTO: 0.11 10*3/MM3 (ref 0–0.4)
EOSINOPHIL NFR BLD AUTO: 1.3 % (ref 0.3–6.2)
ERYTHROCYTE [DISTWIDTH] IN BLOOD BY AUTOMATED COUNT: 14.1 % (ref 12.3–15.4)
FERRITIN SERPL-MCNC: 36.5 NG/ML (ref 30–400)
HCT VFR BLD AUTO: 54.2 % (ref 37.5–51)
HGB BLD-MCNC: 18.4 G/DL (ref 13–17.7)
LYMPHOCYTES # BLD AUTO: 2.21 10*3/MM3 (ref 0.7–3.1)
LYMPHOCYTES NFR BLD AUTO: 26.7 % (ref 19.6–45.3)
MCH RBC QN AUTO: 30.4 PG (ref 26.6–33)
MCHC RBC AUTO-ENTMCNC: 33.9 G/DL (ref 31.5–35.7)
MCV RBC AUTO: 89.6 FL (ref 79–97)
MONOCYTES # BLD AUTO: 0.98 10*3/MM3 (ref 0.1–0.9)
MONOCYTES NFR BLD AUTO: 11.9 % (ref 5–12)
NEUTROPHILS NFR BLD AUTO: 4.95 10*3/MM3 (ref 1.7–7)
NEUTROPHILS NFR BLD AUTO: 59.9 % (ref 42.7–76)
PLATELET # BLD AUTO: 257 10*3/MM3 (ref 140–450)
PMV BLD AUTO: 9.1 FL (ref 6–12)
RBC # BLD AUTO: 6.05 10*6/MM3 (ref 4.14–5.8)
WBC NRBC COR # BLD AUTO: 8.27 10*3/MM3 (ref 3.4–10.8)

## 2024-11-04 PROCEDURE — 82728 ASSAY OF FERRITIN: CPT | Performed by: INTERNAL MEDICINE

## 2024-11-04 PROCEDURE — 99195 PHLEBOTOMY: CPT

## 2024-11-04 PROCEDURE — 36415 COLL VENOUS BLD VENIPUNCTURE: CPT

## 2024-11-04 PROCEDURE — 85025 COMPLETE CBC W/AUTO DIFF WBC: CPT

## 2024-11-04 NOTE — PROGRESS NOTES
Patient came in for TP without complaints. 500 ml removed, treatment tolerated. Patient given AVS and denies further needs today.

## 2024-12-02 ENCOUNTER — HOSPITAL ENCOUNTER (OUTPATIENT)
Dept: ONCOLOGY | Facility: HOSPITAL | Age: 44
Discharge: HOME | End: 2024-12-02
Payer: OTHER GOVERNMENT

## 2024-12-02 ENCOUNTER — LAB (OUTPATIENT)
Dept: LAB | Facility: HOSPITAL | Age: 44
End: 2024-12-02
Payer: OTHER GOVERNMENT

## 2024-12-02 VITALS
SYSTOLIC BLOOD PRESSURE: 133 MMHG | WEIGHT: 280.5 LBS | HEART RATE: 108 BPM | RESPIRATION RATE: 16 BRPM | TEMPERATURE: 98 F | DIASTOLIC BLOOD PRESSURE: 77 MMHG | BODY MASS INDEX: 37.99 KG/M2 | HEIGHT: 72 IN | OXYGEN SATURATION: 96 %

## 2024-12-02 DIAGNOSIS — D75.1 POLYCYTHEMIA: ICD-10-CM

## 2024-12-02 DIAGNOSIS — D75.1 POLYCYTHEMIA: Primary | ICD-10-CM

## 2024-12-02 LAB
BASOPHILS # BLD AUTO: 0.03 10*3/MM3 (ref 0–0.2)
BASOPHILS NFR BLD AUTO: 0.4 % (ref 0–1.5)
DEPRECATED RDW RBC AUTO: 44 FL (ref 37–54)
EOSINOPHIL # BLD AUTO: 0.09 10*3/MM3 (ref 0–0.4)
EOSINOPHIL NFR BLD AUTO: 1.2 % (ref 0.3–6.2)
ERYTHROCYTE [DISTWIDTH] IN BLOOD BY AUTOMATED COUNT: 13.7 % (ref 12.3–15.4)
FERRITIN SERPL-MCNC: 26.6 NG/ML (ref 30–400)
HCT VFR BLD AUTO: 51.5 % (ref 37.5–51)
HGB BLD-MCNC: 17.3 G/DL (ref 13–17.7)
LYMPHOCYTES # BLD AUTO: 2.15 10*3/MM3 (ref 0.7–3.1)
LYMPHOCYTES NFR BLD AUTO: 27.6 % (ref 19.6–45.3)
MCH RBC QN AUTO: 30.1 PG (ref 26.6–33)
MCHC RBC AUTO-ENTMCNC: 33.6 G/DL (ref 31.5–35.7)
MCV RBC AUTO: 89.6 FL (ref 79–97)
MONOCYTES # BLD AUTO: 0.85 10*3/MM3 (ref 0.1–0.9)
MONOCYTES NFR BLD AUTO: 10.9 % (ref 5–12)
NEUTROPHILS NFR BLD AUTO: 4.66 10*3/MM3 (ref 1.7–7)
NEUTROPHILS NFR BLD AUTO: 59.9 % (ref 42.7–76)
PLATELET # BLD AUTO: 297 10*3/MM3 (ref 140–450)
PMV BLD AUTO: 9.4 FL (ref 6–12)
RBC # BLD AUTO: 5.75 10*6/MM3 (ref 4.14–5.8)
WBC NRBC COR # BLD AUTO: 7.78 10*3/MM3 (ref 3.4–10.8)

## 2024-12-02 PROCEDURE — 85025 COMPLETE CBC W/AUTO DIFF WBC: CPT

## 2024-12-02 PROCEDURE — 36415 COLL VENOUS BLD VENIPUNCTURE: CPT

## 2024-12-02 PROCEDURE — 82728 ASSAY OF FERRITIN: CPT | Performed by: INTERNAL MEDICINE

## 2024-12-02 PROCEDURE — 99195 PHLEBOTOMY: CPT

## 2024-12-02 NOTE — PROGRESS NOTES
Patient came in for TP without complaints. 500 ml removed, treatment tolerated. Next apt six weeks out due to holidays and patient schedule. Patient given AVS and denies further needs today.   Inbasket sent for more cycles.

## 2025-02-04 DIAGNOSIS — D75.1 POLYCYTHEMIA: Primary | ICD-10-CM

## 2025-02-04 NOTE — PROGRESS NOTES
Hematology/Oncology Outpatient Follow Up    PATIENT NAME:Anders Garcia  :1980  MRN: [id number]  PRIMARY CARE PHYSICIAN: Alondra Goodman MD  REFERRING PHYSICIAN: Alondra Goodman MD    Chief Complaint   Patient presents with    Follow-up     Polycythemia        HISTORY OF PRESENT ILLNESS:       This is a 43-year-old male has referred secondary to polycythemia.  Patient has been noted to have polycythemia since 2022.  At that time white count was 5.9, hemoglobin 19.4, MCV was normal at 91 and platelets were 274.  7/3/2023 white count was 7.0 hemoglobin is 19 platelets are 269 with essentially unremarkable differentials.  Today 2023 white count is 8.5, hemoglobin is 20.2 and platelets are 181 with unremarkable differentials.     Patient has been referred due to polycythemia.  He had a chemistry panel about a month ago which was essentially unremarkable with a BUN of 16, creatinine of 1.22.     Patient has been on testosterone 2 times a week since . Patient is on 0.5mls twice weekly.  Otherwise he has no prior history of any hematologic problems.  Has never received blood transfusions in the past.        Patient does not smoke. He does not drink     Patient works in      Family history of cancer both [facility]    2023: Patient had work-up for polycythemia.  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)     Vitamin D deficiency        Past Surgical History:   Procedure Laterality Date    KNEE SURGERY Left     2 times    TONSILLECTOMY           Current Outpatient Medications:     ALBUTEROL SULFATE  (90 Base) MCG/ACT inhaler, INHALE 2 PUFFS BY MOUTH EVERY 6 HOURS AS NEEDED FOR WHEEZING, Disp:  25.5 g, Rfl: 0    aspirin 81 MG chewable tablet, Chew 1 tablet Daily., Disp: , Rfl:     dicyclomine (Bentyl) 10 MG capsule, Take 1 capsule by mouth 2 (Two) Times a Day., Disp: 180 capsule, Rfl: 1    losartan-hydrochlorothiazide (HYZAAR) 100-25 MG per tablet, TAKE 1 TABLET DAILY, Disp: 90 tablet, Rfl: 1    Testosterone Cypionate 200 MG/ML solution, Inject  as directed., Disp: , Rfl:     amoxicillin (AMOXIL) 875 MG tablet, Take 1 tablet by mouth 2 (Two) Times a Day. (Patient not taking: Reported on 2/5/2025), Disp: 20 tablet, Rfl: 0    Allergies   Allergen Reactions    Tramadol Itching       Family History   Problem Relation Age of Onset    Hypertension Mother        Cancer-related family history is not on file.    Social History     Tobacco Use    Smoking status: Never    Smokeless tobacco: Current     Types: Chew   Substance Use Topics    Alcohol use: Not Currently    Drug use: No         I have reviewed and confirmed the accuracy of the patient's history: Chief complaint, HPI, ROS, and Subjective as entered by the MA/LPN/RN. Rachell Godoy, APRN 02/05/25 8/1/2024      SUBJECTIVE:  Adners is here today for his routine 6-month follow-up.  He reports that he is doing well.  He has been compliant with phlebotomies either having them here or with [facility] depending on the schedule.  He had a phlebotomy in December in our office and had 1 with the [facility] several weeks ago.  He is compliant with his daily aspirin and notes no issues or concerns today.  He had an appointment to follow-up with gastroenterology for the hepatic steatosis but his appointment was canceled by their office and he has not yet rescheduled.    Patient is here today for routine follow-up and does not have any new issues.  He remains on testosterone injections.    Patient has been noncompliant with phlebotomies through the [facility] due to work schedule conflict       Denies any new issues.  He is here today for routine follow-up.  Continues  "to tolerate phlebotomies well.          REVIEW OF SYSTEMS:    Review of Systems   Constitutional:  Negative for chills, fatigue and fever.   HENT:  Negative for congestion, drooling, ear discharge, rhinorrhea, sinus pressure and tinnitus.    Eyes:  Negative for photophobia, pain and discharge.   Respiratory:  Negative for apnea, choking and stridor.    Cardiovascular:  Negative for palpitations.   Gastrointestinal:  Negative for abdominal distention, abdominal pain and anal bleeding.   Endocrine: Negative for polydipsia and polyphagia.   Genitourinary:  Negative for decreased urine volume, flank pain and genital sores.   Musculoskeletal:  Negative for gait problem, neck pain and neck stiffness.   Skin:  Negative for color change, rash and wound.   Neurological:  Negative for tremors, seizures, syncope, facial asymmetry and speech difficulty.   Hematological:  Negative for adenopathy.   Psychiatric/Behavioral:  Negative for agitation, confusion, hallucinations and self-injury. The patient is not hyperactive.        OBJECTIVE:    Vitals:    02/05/25 0929   BP: 137/91   Pulse: 75   Temp: 97.7 °F (36.5 °C)   SpO2: 99%   Weight: 122 kg (270 lb)   Height: 182.9 cm (72.01\")   PainSc: 0-No pain       Body mass index is 36.61 kg/m².      ECOG    (0) Fully active, able to carry on all predisease performance without restriction    Physical Exam  Vitals and nursing note reviewed.   Constitutional:       General: He is not in acute distress.     Appearance: He is not diaphoretic.   HENT:      Head: Normocephalic and atraumatic.   Eyes:      General: No scleral icterus.        Right eye: No discharge.         Left eye: No discharge.      Conjunctiva/sclera: Conjunctivae normal.   Neck:      Thyroid: No thyromegaly.   Cardiovascular:      Rate and Rhythm: Normal rate and regular rhythm.      Heart sounds: Normal heart sounds.      No friction rub. No gallop.   Pulmonary:      Effort: Pulmonary effort is normal. No respiratory " distress.      Breath sounds: No stridor. No wheezing.   Abdominal:      General: Bowel sounds are normal.      Palpations: Abdomen is soft. There is no mass.      Tenderness: There is no abdominal tenderness. There is no guarding or rebound.   Musculoskeletal:         General: No tenderness. Normal range of motion.      Cervical back: Normal range of motion and neck supple.      Right lower leg: No edema.      Left lower leg: No edema.   Lymphadenopathy:      Cervical: No cervical adenopathy.   Skin:     General: Skin is warm.      Findings: No erythema or rash.   Neurological:      Mental Status: He is alert and oriented to person, place, and time.      Motor: No abnormal muscle tone.   Psychiatric:         Mood and Affect: Mood normal.         Behavior: Behavior normal.       I have reexamined the patient and the results are consistent with the previously documented exam. Rachell Godoy, YOKO          RECENT LABS    WBC   Date Value Ref Range Status   02/05/2025 6.50 3.40 - 10.80 10*3/mm3 Final   06/02/2022 7.3 3.4 - 10.8 x10E3/uL Final     RBC   Date Value Ref Range Status   02/05/2025 5.61 4.14 - 5.80 10*6/mm3 Final   06/02/2022 6.11 (H) 4.14 - 5.80 x10E6/uL Final     Hemoglobin   Date Value Ref Range Status   02/05/2025 16.1 13.0 - 17.7 g/dL Final     Hematocrit   Date Value Ref Range Status   02/05/2025 49.9 37.5 - 51.0 % Final     MCV   Date Value Ref Range Status   02/05/2025 88.9 79.0 - 97.0 fL Final     MCH   Date Value Ref Range Status   02/05/2025 28.7 26.6 - 33.0 pg Final     MCHC   Date Value Ref Range Status   02/05/2025 32.3 31.5 - 35.7 g/dL Final     RDW   Date Value Ref Range Status   02/05/2025 13.2 12.3 - 15.4 % Final     RDW-SD   Date Value Ref Range Status   02/05/2025 42.8 37.0 - 54.0 fl Final     MPV   Date Value Ref Range Status   02/05/2025 9.2 6.0 - 12.0 fL Final     Platelets   Date Value Ref Range Status   02/05/2025 283 140 - 450 10*3/mm3 Final     Neutrophil %   Date Value Ref  Range Status   02/05/2025 54.6 42.7 - 76.0 % Final     Lymphocyte %   Date Value Ref Range Status   02/05/2025 29.1 19.6 - 45.3 % Final     Monocyte %   Date Value Ref Range Status   02/05/2025 13.4 (H) 5.0 - 12.0 % Final     Eosinophil %   Date Value Ref Range Status   02/05/2025 2.6 0.3 - 6.2 % Final     Basophil %   Date Value Ref Range Status   02/05/2025 0.3 0.0 - 1.5 % Final     Immature Grans %   Date Value Ref Range Status   07/03/2023 0.4 0.0 - 0.5 % Final     Neutrophils, Absolute   Date Value Ref Range Status   02/05/2025 3.55 1.70 - 7.00 10*3/mm3 Final     Lymphocytes, Absolute   Date Value Ref Range Status   02/05/2025 1.89 0.70 - 3.10 10*3/mm3 Final     Monocytes, Absolute   Date Value Ref Range Status   02/05/2025 0.87 0.10 - 0.90 10*3/mm3 Final     Eosinophils, Absolute   Date Value Ref Range Status   02/05/2025 0.17 0.00 - 0.40 10*3/mm3 Final     Basophils, Absolute   Date Value Ref Range Status   02/05/2025 0.02 0.00 - 0.20 10*3/mm3 Final     Immature Grans, Absolute   Date Value Ref Range Status   07/03/2023 0.03 0.00 - 0.05 10*3/mm3 Final     nRBC   Date Value Ref Range Status   07/03/2023 0.0 0.0 - 0.2 /100 WBC Final       Lab Results   Component Value Date    GLUCOSE 93 07/03/2023    BUN 16 07/03/2023    CREATININE 1.22 07/03/2023    EGFRIFNONA 82 01/22/2020    BCR 13.1 07/03/2023    K 3.7 07/03/2023    CO2 28.3 07/03/2023    CALCIUM 9.8 07/03/2023    ALBUMIN 4.5 07/03/2023    LABIL2 1.5 04/17/2019    AST 22 07/03/2023    ALT 19 07/03/2023         Assessment & Plan     Polycythemia  - CBC & Differential  - Comprehensive Metabolic Panel      ASSESSMENT:  Polycythemia rule out myeloproliferative disease.  Discussed the differential diagnosis with patient primary versus secondary.  Suspect this may be secondary secondary to testosterone administration.  Work-up was essentially negative for primary myeloproliferative disease patient has also responded to phlebotomies.  .  Continue phlebotomies.   Patient goes to the [facility].  Hemoglobin today 16.1 g/dL.  Fatty liver: He was referred to GI   Testosterone administration likely contributing to the above.  Patient has already cut back through the advice of his prescribing physician       Plans       CBC today reviewed with the patient showing a hemoglobin of 16.1 g/dL/hematocrit 49.9.  No need for phlebotomy today.  Continue monthly CBC with phlebotomy as needed.  Discussed that he can have them in our office or when the timing works out for him he can donate at the [facility]  CBC every 3 months  Follow-up 6 months with Dr. Millan or sooner if needed  Continue baby aspirin and stay well-hydrated  Call earlier as needed for problems  Patient will follow-up with his physician for adjustment of his testosterone medication.  This has been completed.  Schedule ultrasound of the abdomen to evaluate for spleen size.  Reviewed he has normal spleen size  Follow-up with GI for fatty liver.  Reviewed  All questions answered     Patient verbalized understanding and is in agreement of the above plan.

## 2025-02-05 ENCOUNTER — LAB (OUTPATIENT)
Dept: LAB | Facility: HOSPITAL | Age: 45
End: 2025-02-05
Payer: OTHER GOVERNMENT

## 2025-02-05 ENCOUNTER — OFFICE VISIT (OUTPATIENT)
Dept: ONCOLOGY | Facility: CLINIC | Age: 45
End: 2025-02-05
Payer: OTHER GOVERNMENT

## 2025-02-05 VITALS
TEMPERATURE: 97.7 F | SYSTOLIC BLOOD PRESSURE: 137 MMHG | BODY MASS INDEX: 36.57 KG/M2 | WEIGHT: 270 LBS | HEIGHT: 72 IN | OXYGEN SATURATION: 99 % | HEART RATE: 75 BPM | DIASTOLIC BLOOD PRESSURE: 91 MMHG

## 2025-02-05 DIAGNOSIS — D75.1 POLYCYTHEMIA: Primary | ICD-10-CM

## 2025-02-05 LAB
ALBUMIN SERPL-MCNC: 4.7 G/DL (ref 3.5–5.2)
ALBUMIN/GLOB SERPL: 1.8 G/DL
ALP SERPL-CCNC: 63 U/L (ref 39–117)
ALT SERPL W P-5'-P-CCNC: 19 U/L (ref 1–41)
ANION GAP SERPL CALCULATED.3IONS-SCNC: 10.5 MMOL/L (ref 5–15)
AST SERPL-CCNC: 23 U/L (ref 1–40)
BASOPHILS # BLD AUTO: 0.02 10*3/MM3 (ref 0–0.2)
BASOPHILS NFR BLD AUTO: 0.3 % (ref 0–1.5)
BILIRUB SERPL-MCNC: 0.6 MG/DL (ref 0–1.2)
BUN SERPL-MCNC: 23 MG/DL (ref 6–20)
BUN/CREAT SERPL: 19 (ref 7–25)
CALCIUM SPEC-SCNC: 9.7 MG/DL (ref 8.6–10.5)
CHLORIDE SERPL-SCNC: 101 MMOL/L (ref 98–107)
CO2 SERPL-SCNC: 27.5 MMOL/L (ref 22–29)
CREAT SERPL-MCNC: 1.21 MG/DL (ref 0.76–1.27)
DEPRECATED RDW RBC AUTO: 42.8 FL (ref 37–54)
EGFRCR SERPLBLD CKD-EPI 2021: 76.2 ML/MIN/1.73
EOSINOPHIL # BLD AUTO: 0.17 10*3/MM3 (ref 0–0.4)
EOSINOPHIL NFR BLD AUTO: 2.6 % (ref 0.3–6.2)
ERYTHROCYTE [DISTWIDTH] IN BLOOD BY AUTOMATED COUNT: 13.2 % (ref 12.3–15.4)
GLOBULIN UR ELPH-MCNC: 2.6 GM/DL
GLUCOSE SERPL-MCNC: 107 MG/DL (ref 65–99)
HCT VFR BLD AUTO: 49.9 % (ref 37.5–51)
HGB BLD-MCNC: 16.1 G/DL (ref 13–17.7)
HOLD SPECIMEN: NORMAL
LYMPHOCYTES # BLD AUTO: 1.89 10*3/MM3 (ref 0.7–3.1)
LYMPHOCYTES NFR BLD AUTO: 29.1 % (ref 19.6–45.3)
MCH RBC QN AUTO: 28.7 PG (ref 26.6–33)
MCHC RBC AUTO-ENTMCNC: 32.3 G/DL (ref 31.5–35.7)
MCV RBC AUTO: 88.9 FL (ref 79–97)
MONOCYTES # BLD AUTO: 0.87 10*3/MM3 (ref 0.1–0.9)
MONOCYTES NFR BLD AUTO: 13.4 % (ref 5–12)
NEUTROPHILS NFR BLD AUTO: 3.55 10*3/MM3 (ref 1.7–7)
NEUTROPHILS NFR BLD AUTO: 54.6 % (ref 42.7–76)
PLATELET # BLD AUTO: 283 10*3/MM3 (ref 140–450)
PMV BLD AUTO: 9.2 FL (ref 6–12)
POTASSIUM SERPL-SCNC: 3.7 MMOL/L (ref 3.5–5.2)
PROT SERPL-MCNC: 7.3 G/DL (ref 6–8.5)
RBC # BLD AUTO: 5.61 10*6/MM3 (ref 4.14–5.8)
SODIUM SERPL-SCNC: 139 MMOL/L (ref 136–145)
WBC NRBC COR # BLD AUTO: 6.5 10*3/MM3 (ref 3.4–10.8)

## 2025-02-05 PROCEDURE — 85025 COMPLETE CBC W/AUTO DIFF WBC: CPT

## 2025-02-05 PROCEDURE — 99214 OFFICE O/P EST MOD 30 MIN: CPT | Performed by: NURSE PRACTITIONER

## 2025-02-05 PROCEDURE — 80053 COMPREHEN METABOLIC PANEL: CPT | Performed by: NURSE PRACTITIONER

## 2025-02-05 PROCEDURE — 36415 COLL VENOUS BLD VENIPUNCTURE: CPT

## 2025-03-31 RX ORDER — LOSARTAN POTASSIUM AND HYDROCHLOROTHIAZIDE 25; 100 MG/1; MG/1
1 TABLET ORAL DAILY
Qty: 90 TABLET | Refills: 0 | Status: SHIPPED | OUTPATIENT
Start: 2025-03-31

## 2025-04-01 ENCOUNTER — LAB (OUTPATIENT)
Dept: FAMILY MEDICINE CLINIC | Facility: CLINIC | Age: 45
End: 2025-04-01
Payer: OTHER GOVERNMENT

## 2025-04-01 ENCOUNTER — OFFICE VISIT (OUTPATIENT)
Dept: FAMILY MEDICINE CLINIC | Facility: CLINIC | Age: 45
End: 2025-04-01
Payer: OTHER GOVERNMENT

## 2025-04-01 VITALS
SYSTOLIC BLOOD PRESSURE: 130 MMHG | OXYGEN SATURATION: 97 % | BODY MASS INDEX: 36.6 KG/M2 | DIASTOLIC BLOOD PRESSURE: 87 MMHG | HEART RATE: 76 BPM | RESPIRATION RATE: 15 BRPM | HEIGHT: 72 IN | TEMPERATURE: 98 F | WEIGHT: 270.2 LBS

## 2025-04-01 DIAGNOSIS — Z00.00 PREVENTATIVE HEALTH CARE: Primary | ICD-10-CM

## 2025-04-01 DIAGNOSIS — Z12.5 PROSTATE CANCER SCREENING: ICD-10-CM

## 2025-04-01 PROBLEM — J02.9 PHARYNGITIS: Status: RESOLVED | Noted: 2024-01-16 | Resolved: 2025-04-01

## 2025-04-01 PROBLEM — B35.1 ONYCHOMYCOSIS: Status: RESOLVED | Noted: 2020-01-22 | Resolved: 2025-04-01

## 2025-04-01 LAB
ANION GAP SERPL CALCULATED.3IONS-SCNC: 13.5 MMOL/L (ref 5–15)
BUN SERPL-MCNC: 17 MG/DL (ref 6–20)
BUN/CREAT SERPL: 14.5 (ref 7–25)
CALCIUM SPEC-SCNC: 9.4 MG/DL (ref 8.6–10.5)
CHLORIDE SERPL-SCNC: 101 MMOL/L (ref 98–107)
CHOLEST SERPL-MCNC: 194 MG/DL (ref 0–200)
CO2 SERPL-SCNC: 26.5 MMOL/L (ref 22–29)
CREAT SERPL-MCNC: 1.17 MG/DL (ref 0.76–1.27)
EGFRCR SERPLBLD CKD-EPI 2021: 79.3 ML/MIN/1.73
GLUCOSE SERPL-MCNC: 93 MG/DL (ref 65–99)
HDLC SERPL-MCNC: 37 MG/DL (ref 40–60)
LDLC SERPL CALC-MCNC: 120 MG/DL (ref 0–100)
LDLC/HDLC SERPL: 3.12 {RATIO}
POTASSIUM SERPL-SCNC: 3.3 MMOL/L (ref 3.5–5.2)
PSA SERPL-MCNC: 1.06 NG/ML (ref 0–4)
SODIUM SERPL-SCNC: 141 MMOL/L (ref 136–145)
TRIGL SERPL-MCNC: 208 MG/DL (ref 0–150)
VLDLC SERPL-MCNC: 37 MG/DL (ref 5–40)

## 2025-04-01 PROCEDURE — 80061 LIPID PANEL: CPT | Performed by: FAMILY MEDICINE

## 2025-04-01 PROCEDURE — 80048 BASIC METABOLIC PNL TOTAL CA: CPT | Performed by: FAMILY MEDICINE

## 2025-04-01 PROCEDURE — G0103 PSA SCREENING: HCPCS | Performed by: FAMILY MEDICINE

## 2025-04-01 PROCEDURE — 99396 PREV VISIT EST AGE 40-64: CPT | Performed by: FAMILY MEDICINE

## 2025-04-01 PROCEDURE — 36415 COLL VENOUS BLD VENIPUNCTURE: CPT | Performed by: FAMILY MEDICINE

## 2025-04-01 NOTE — PROGRESS NOTES
Subjective   Chief Complaint   Patient presents with    Annual Exam     Anders Garcia is a 44 y.o. male.     Patient Care Team:  Alondra Goodman MD as PCP - General  Alondra Goodman MD as PCP - Family Medicine  Ivy Millan MD as Consulting Physician (Hematology and Oncology)    History of Present Illness  He is coming in today for his annual wellness exam and he is also due for some fasting blood work.  Patient reports that couple of years ago he started testosterone shots through online clinic.  He reports that he gives himself shots at 100 mg every week.  He was referred to see the hematologist couple of years ago due to his hemoglobin being elevated, he tells me that he keeps up with these appointments and he gets periodic phlebotomies and he also tries to donate blood every 2 months.  His hemoglobin was checked last time in 2/2025 and it was 16.1.  He also reports that over the last year or so he has been working much more on his diet and has lost about 30 pounds.  He feels good about his weight loss, he reports having much more energy and overall feeling much better since he has been on testosterone shots. Patient does not report any chest pain, shortness of breath, dizziness, nausea, vomiting, or diarrhea, visual issues, headaches, numbness or tingling. No urinary issues reported like urgency, frequency, or discomfort upon urination.  No significant weight changes reported.  No swelling reported.  No rashes or any other skin issues reported. No emotional issues or insomnia.       The following portions of the patient's history were reviewed and updated as appropriate: allergies, current medications, past family history, past medical history, past social history, past surgical history, and problem list.  Past Medical History:   Diagnosis Date    Back pain     Hyperlipidemia     Hypertension     Obesity     RMSF ([place] spotted fever) 2008    Vitamin D deficiency      Past Surgical  History:   Procedure Laterality Date    KNEE SURGERY Left 2018    2 times    TONSILLECTOMY       The patient has a family history of  Family History   Problem Relation Age of Onset    Hypertension Mother      Social History     Socioeconomic History    Marital status:    Tobacco Use    Smoking status: Never    Smokeless tobacco: Current     Types: Chew   Vaping Use    Vaping status: Never Used   Substance and Sexual Activity    Alcohol use: Not Currently    Drug use: No    Sexual activity: Yes     Partners: Male     Birth control/protection: None       Review of Systems   Constitutional:  Negative for activity change, appetite change, chills, fatigue, fever, unexpected weight gain and unexpected weight loss.   HENT:  Negative for congestion, ear pain, hearing loss, nosebleeds, postnasal drip, swollen glands, tinnitus and trouble swallowing.    Eyes:  Negative for blurred vision, double vision and visual disturbance.   Respiratory:  Negative for cough, choking, chest tightness, shortness of breath, wheezing and stridor.    Cardiovascular:  Negative for chest pain, palpitations and leg swelling.   Gastrointestinal:  Negative for abdominal distention, abdominal pain, anal bleeding, constipation, diarrhea, nausea, vomiting and GERD.   Endocrine: Negative for cold intolerance, heat intolerance and polyphagia.   Genitourinary:  Negative for dysuria, flank pain, frequency, hematuria and urgency.   Musculoskeletal:  Negative for arthralgias, back pain, gait problem, joint swelling and neck pain.   Skin:  Negative for color change, dry skin and skin lesions.   Allergic/Immunologic: Negative for environmental allergies and food allergies.   Neurological:  Negative for dizziness, tremors, speech difficulty, light-headedness, numbness, headache and confusion.   Hematological:  Negative for adenopathy. Does not bruise/bleed easily.   Psychiatric/Behavioral:  Negative for decreased concentration, sleep disturbance,  "depressed mood and stress.      Visit Vitals  /87 (BP Location: Left arm, Patient Position: Sitting, Cuff Size: Large Adult)   Pulse 76   Temp 98 °F (36.7 °C) (Infrared)   Resp 15   Ht 182.9 cm (72.01\")   Wt 123 kg (270 lb 3.2 oz)   SpO2 97%   BMI 36.64 kg/m²       Class 2 Severe Obesity (BMI >=35 and <=39.9). Obesity-related health conditions include the following: hypertension. Obesity is unchanged. BMI is is above average; BMI management plan is completed. We discussed portion control and increasing exercise.      Current Outpatient Medications:     ALBUTEROL SULFATE  (90 Base) MCG/ACT inhaler, INHALE 2 PUFFS BY MOUTH EVERY 6 HOURS AS NEEDED FOR WHEEZING, Disp: 25.5 g, Rfl: 0    aspirin 81 MG chewable tablet, Chew 1 tablet Daily., Disp: , Rfl:     dicyclomine (Bentyl) 10 MG capsule, Take 1 capsule by mouth 2 (Two) Times a Day., Disp: 180 capsule, Rfl: 1    losartan-hydrochlorothiazide (HYZAAR) 100-25 MG per tablet, TAKE 1 TABLET DAILY, Disp: 90 tablet, Rfl: 0    Testosterone Cypionate 200 MG/ML solution, Inject  as directed., Disp: , Rfl:     Objective   Physical Exam  Vitals and nursing note reviewed.   Constitutional:       General: He is not in acute distress.     Appearance: He is well-developed. He is not diaphoretic.   HENT:      Head: Normocephalic and atraumatic.      Right Ear: External ear normal.      Left Ear: External ear normal.   Eyes:      General: No scleral icterus.        Right eye: No discharge.         Left eye: No discharge.      Conjunctiva/sclera: Conjunctivae normal.      Pupils: Pupils are equal, round, and reactive to light.   Neck:      Thyroid: No thyromegaly.      Vascular: No JVD.   Cardiovascular:      Rate and Rhythm: Normal rate and regular rhythm.      Heart sounds: Normal heart sounds. No murmur heard.     No friction rub. No gallop.   Pulmonary:      Effort: Pulmonary effort is normal. No respiratory distress.      Breath sounds: Normal breath sounds. No stridor. " No wheezing, rhonchi or rales.   Abdominal:      General: Bowel sounds are normal. There is no distension.      Palpations: Abdomen is soft. There is no mass.      Tenderness: There is no abdominal tenderness. There is no guarding or rebound.      Hernia: No hernia is present.   Musculoskeletal:         General: No swelling, tenderness or deformity. Normal range of motion.      Cervical back: Normal range of motion and neck supple. No muscular tenderness.      Right lower leg: No edema.      Left lower leg: No edema.   Lymphadenopathy:      Cervical: No cervical adenopathy.   Skin:     General: Skin is warm and dry.      Capillary Refill: Capillary refill takes less than 2 seconds.      Coloration: Skin is not pale.      Findings: No bruising, erythema, lesion or rash.   Neurological:      General: No focal deficit present.      Mental Status: He is alert and oriented to person, place, and time.      Cranial Nerves: No cranial nerve deficit.      Sensory: No sensory deficit.      Motor: No weakness.      Coordination: Coordination normal.      Deep Tendon Reflexes: Reflexes normal.   Psychiatric:         Mood and Affect: Mood normal.         Behavior: Behavior normal.         Judgment: Judgment normal.       CBC          11/4/2024    14:16 12/2/2024    14:36 2/5/2025    09:26   CBC   WBC 8.27  7.78  6.50    RBC 6.05  5.75  5.61    Hemoglobin 18.4  17.3  16.1    Hematocrit 54.2  51.5  49.9    MCV 89.6  89.6  88.9    MCH 30.4  30.1  28.7    MCHC 33.9  33.6  32.3    RDW 14.1  13.7  13.2    Platelets 257  297  283        Assessment & Plan   Diagnoses and all orders for this visit:    1. Preventative health care (Primary)  -     Basic Metabolic Panel  -     Lipid Panel    2. Prostate cancer screening  -     PSA Screen        Wellness exam was done today - see above for details. Healthy life style was reviewed and discussed and re-enforced. Regular exercise and healthy diet were also discussed and recommended.  I will be  getting fasting blood work.  He will follow-up with his hematologist to further address his polycythemia, his most recent CBC checked in 2/2025 showed normal hemoglobin at 16.1, patient reports being on testosterone shots for couple of years and he gets these prescriptions through online service.  I discussed with the patient that testosterone shots can cause elevated hemoglobin and polycythemia can increase the risk of clotting.  Patient has been donating blood every 2 months or so.      Return in about 1 year (around 4/1/2026) for Annual physical.    Requested Prescriptions      No prescriptions requested or ordered in this encounter       Alondra Goodman MD  04/01/2025  08:58 EDT

## 2025-05-07 ENCOUNTER — LAB (OUTPATIENT)
Dept: LAB | Facility: HOSPITAL | Age: 45
End: 2025-05-07
Payer: OTHER GOVERNMENT

## 2025-05-07 ENCOUNTER — HOSPITAL ENCOUNTER (OUTPATIENT)
Dept: ONCOLOGY | Facility: HOSPITAL | Age: 45
Discharge: HOME | End: 2025-05-07
Payer: OTHER GOVERNMENT

## 2025-05-07 VITALS
BODY MASS INDEX: 37.25 KG/M2 | RESPIRATION RATE: 14 BRPM | HEIGHT: 72 IN | SYSTOLIC BLOOD PRESSURE: 134 MMHG | HEART RATE: 76 BPM | DIASTOLIC BLOOD PRESSURE: 84 MMHG | OXYGEN SATURATION: 94 % | TEMPERATURE: 97.5 F | WEIGHT: 275 LBS

## 2025-05-07 DIAGNOSIS — D75.1 POLYCYTHEMIA: ICD-10-CM

## 2025-05-07 DIAGNOSIS — D75.1 POLYCYTHEMIA: Primary | ICD-10-CM

## 2025-05-07 LAB
BASOPHILS # BLD AUTO: 0.03 10*3/MM3 (ref 0–0.2)
BASOPHILS NFR BLD AUTO: 0.4 % (ref 0–1.5)
DEPRECATED RDW RBC AUTO: 49.3 FL (ref 37–54)
EOSINOPHIL # BLD AUTO: 0.05 10*3/MM3 (ref 0–0.4)
EOSINOPHIL NFR BLD AUTO: 0.7 % (ref 0.3–6.2)
ERYTHROCYTE [DISTWIDTH] IN BLOOD BY AUTOMATED COUNT: 16.8 % (ref 12.3–15.4)
FERRITIN SERPL-MCNC: 26.9 NG/ML (ref 30–400)
HCT VFR BLD AUTO: 53.9 % (ref 37.5–51)
HGB BLD-MCNC: 17.8 G/DL (ref 13–17.7)
LYMPHOCYTES # BLD AUTO: 1.87 10*3/MM3 (ref 0.7–3.1)
LYMPHOCYTES NFR BLD AUTO: 25.3 % (ref 19.6–45.3)
MCH RBC QN AUTO: 28.5 PG (ref 26.6–33)
MCHC RBC AUTO-ENTMCNC: 33 G/DL (ref 31.5–35.7)
MCV RBC AUTO: 86.2 FL (ref 79–97)
MONOCYTES # BLD AUTO: 1.01 10*3/MM3 (ref 0.1–0.9)
MONOCYTES NFR BLD AUTO: 13.7 % (ref 5–12)
NEUTROPHILS NFR BLD AUTO: 4.43 10*3/MM3 (ref 1.7–7)
NEUTROPHILS NFR BLD AUTO: 59.9 % (ref 42.7–76)
PLATELET # BLD AUTO: 254 10*3/MM3 (ref 140–450)
PMV BLD AUTO: 9 FL (ref 6–12)
RBC # BLD AUTO: 6.25 10*6/MM3 (ref 4.14–5.8)
WBC NRBC COR # BLD AUTO: 7.39 10*3/MM3 (ref 3.4–10.8)

## 2025-05-07 PROCEDURE — 36415 COLL VENOUS BLD VENIPUNCTURE: CPT

## 2025-05-07 PROCEDURE — 85025 COMPLETE CBC W/AUTO DIFF WBC: CPT

## 2025-05-07 PROCEDURE — 99195 PHLEBOTOMY: CPT

## 2025-05-07 PROCEDURE — 82728 ASSAY OF FERRITIN: CPT | Performed by: INTERNAL MEDICINE

## 2025-07-08 RX ORDER — LOSARTAN POTASSIUM AND HYDROCHLOROTHIAZIDE 25; 100 MG/1; MG/1
1 TABLET ORAL DAILY
Qty: 90 TABLET | Refills: 0 | Status: SHIPPED | OUTPATIENT
Start: 2025-07-08

## 2025-08-06 DIAGNOSIS — D75.1 POLYCYTHEMIA: Primary | ICD-10-CM

## 2025-08-07 ENCOUNTER — HOSPITAL ENCOUNTER (OUTPATIENT)
Dept: ONCOLOGY | Facility: HOSPITAL | Age: 45
Discharge: HOME | End: 2025-08-07
Payer: OTHER GOVERNMENT

## 2025-08-07 ENCOUNTER — OFFICE VISIT (OUTPATIENT)
Dept: ONCOLOGY | Facility: CLINIC | Age: 45
End: 2025-08-07
Payer: OTHER GOVERNMENT

## 2025-08-07 ENCOUNTER — LAB (OUTPATIENT)
Dept: LAB | Facility: HOSPITAL | Age: 45
End: 2025-08-07
Payer: OTHER GOVERNMENT

## 2025-08-07 VITALS — HEART RATE: 84 BPM | SYSTOLIC BLOOD PRESSURE: 147 MMHG | DIASTOLIC BLOOD PRESSURE: 90 MMHG

## 2025-08-07 VITALS
TEMPERATURE: 97.9 F | BODY MASS INDEX: 37.52 KG/M2 | WEIGHT: 277 LBS | SYSTOLIC BLOOD PRESSURE: 129 MMHG | OXYGEN SATURATION: 96 % | HEART RATE: 76 BPM | DIASTOLIC BLOOD PRESSURE: 83 MMHG | HEIGHT: 72 IN | RESPIRATION RATE: 20 BRPM

## 2025-08-07 DIAGNOSIS — D75.1 POLYCYTHEMIA: Primary | ICD-10-CM

## 2025-08-07 DIAGNOSIS — K76.0 FATTY LIVER: ICD-10-CM

## 2025-08-07 DIAGNOSIS — D75.1 POLYCYTHEMIA: ICD-10-CM

## 2025-08-07 LAB
BASOPHILS # BLD AUTO: 0.02 10*3/MM3 (ref 0–0.2)
BASOPHILS NFR BLD AUTO: 0.2 % (ref 0–1.5)
DEPRECATED RDW RBC AUTO: 42 FL (ref 37–54)
EOSINOPHIL # BLD AUTO: 0.14 10*3/MM3 (ref 0–0.4)
EOSINOPHIL NFR BLD AUTO: 1.6 % (ref 0.3–6.2)
ERYTHROCYTE [DISTWIDTH] IN BLOOD BY AUTOMATED COUNT: 12.9 % (ref 12.3–15.4)
FERRITIN SERPL-MCNC: 52 NG/ML (ref 30–400)
HCT VFR BLD AUTO: 52.3 % (ref 37.5–51)
HGB BLD-MCNC: 17.5 G/DL (ref 13–17.7)
IMM GRANULOCYTES # BLD AUTO: 0.02 10*3/MM3 (ref 0–0.05)
IMM GRANULOCYTES NFR BLD AUTO: 0.2 % (ref 0–0.5)
LYMPHOCYTES # BLD AUTO: 1.71 10*3/MM3 (ref 0.7–3.1)
LYMPHOCYTES NFR BLD AUTO: 19.7 % (ref 19.6–45.3)
MCH RBC QN AUTO: 29.5 PG (ref 26.6–33)
MCHC RBC AUTO-ENTMCNC: 33.5 G/DL (ref 31.5–35.7)
MCV RBC AUTO: 88 FL (ref 79–97)
MONOCYTES # BLD AUTO: 1.11 10*3/MM3 (ref 0.1–0.9)
MONOCYTES NFR BLD AUTO: 12.8 % (ref 5–12)
NEUTROPHILS NFR BLD AUTO: 5.69 10*3/MM3 (ref 1.7–7)
NEUTROPHILS NFR BLD AUTO: 65.5 % (ref 42.7–76)
PLATELET # BLD AUTO: 277 10*3/MM3 (ref 140–450)
PMV BLD AUTO: 8.9 FL (ref 6–12)
RBC # BLD AUTO: 5.94 10*6/MM3 (ref 4.14–5.8)
WBC NRBC COR # BLD AUTO: 8.69 10*3/MM3 (ref 3.4–10.8)

## 2025-08-07 PROCEDURE — 36415 COLL VENOUS BLD VENIPUNCTURE: CPT

## 2025-08-07 PROCEDURE — 82728 ASSAY OF FERRITIN: CPT | Performed by: INTERNAL MEDICINE

## 2025-08-07 PROCEDURE — 85025 COMPLETE CBC W/AUTO DIFF WBC: CPT

## 2025-08-07 PROCEDURE — 99195 PHLEBOTOMY: CPT
